# Patient Record
Sex: FEMALE | Race: WHITE | NOT HISPANIC OR LATINO | Employment: OTHER | URBAN - METROPOLITAN AREA
[De-identification: names, ages, dates, MRNs, and addresses within clinical notes are randomized per-mention and may not be internally consistent; named-entity substitution may affect disease eponyms.]

---

## 2022-11-02 ENCOUNTER — OFFICE VISIT (OUTPATIENT)
Dept: FAMILY MEDICINE CLINIC | Facility: CLINIC | Age: 72
End: 2022-11-02

## 2022-11-02 VITALS
HEIGHT: 65 IN | TEMPERATURE: 98 F | HEART RATE: 96 BPM | OXYGEN SATURATION: 95 % | BODY MASS INDEX: 18.83 KG/M2 | WEIGHT: 113 LBS

## 2022-11-02 DIAGNOSIS — F43.9 STRESS AT HOME: Primary | ICD-10-CM

## 2022-11-02 DIAGNOSIS — Z00.00 MEDICARE ANNUAL WELLNESS VISIT, SUBSEQUENT: ICD-10-CM
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
## 2022-11-02 DIAGNOSIS — M25.559 HIP PAIN: ICD-10-CM

## 2022-11-02 DIAGNOSIS — Z76.89 ENCOUNTER TO ESTABLISH CARE WITH NEW DOCTOR: ICD-10-CM

## 2022-11-02 DIAGNOSIS — L57.0 ACTINIC KERATOSIS: ICD-10-CM

## 2022-11-02 DIAGNOSIS — Z12.39 ENCOUNTER FOR BREAST CANCER SCREENING USING NON-MAMMOGRAM MODALITY: ICD-10-CM

## 2022-11-02 DIAGNOSIS — Z63.0 STRESS DUE TO MARITAL PROBLEMS: ICD-10-CM

## 2022-11-02 DIAGNOSIS — E89.40 ASYMPTOMATIC POSTSURGICAL MENOPAUSE: ICD-10-CM

## 2022-11-02 DIAGNOSIS — L82.1 SK (SEBORRHEIC KERATOSIS): ICD-10-CM

## 2022-11-02 SDOH — SOCIAL STABILITY - SOCIAL INSECURITY: PROBLEMS IN RELATIONSHIP WITH SPOUSE OR PARTNER: Z63.0

## 2022-11-02 NOTE — PROGRESS NOTES
Assessment and Plan:     Problem List Items Addressed This Visit        Other    Stress at home - Primary    Stress due to marital problems     Not interested in therapy or marriage counselor         Excess sun exposure    Relevant Orders    Ambulatory Referral to Dermatology      Other Visit Diagnoses     Encounter to establish care with new doctor        Medicare annual wellness visit, subsequent        Actinic keratosis        SK (seborrheic keratosis)        Asymptomatic postsurgical menopause        Relevant Orders    DXA bone density spine hip and pelvis    Encounter for breast cancer screening using non-mammogram modality        Relevant Orders    US breast screening bilateral complete (ABUS)    Hip pain            Patient wants to follow through with the procedures order today referral to Dermatology in wants to wait on any further lab work as she is worried about the cost       Depression Screening and Follow-up Plan: Patient was screened for depression during today's encounter  They screened negative with a PHQ-2 score of 0  Preventive health issues were discussed with patient, and age appropriate screening tests were ordered as noted in patient's After Visit Summary  Personalized health advice and appropriate referrals for health education or preventive services given if needed, as noted in patient's After Visit Summary  History of Present Illness:     Patient presents for a Medicare Wellness Visit    Declines Blood pressure check  Reports she is going to move back to [place]  Dealing with stress at home, does not like the weather here  Not interested in marriage counsoling  Living with ex   Wants to have skin checked, was a surfer and used tanning beds     Patient Care Team:  Sara Calhoun MD as PCP - General (Family Medicine)     Review of Systems:     Review of Systems   Constitutional: Negative for chills, fatigue and fever  HENT: Negative for rhinorrhea and sore throat  Eyes: Negative for visual disturbance  Respiratory: Negative for cough and shortness of breath  Cardiovascular: Negative for chest pain and palpitations  Gastrointestinal: Negative for abdominal pain, constipation, diarrhea, nausea and vomiting  Genitourinary: Negative for difficulty urinating, dysuria and frequency  Musculoskeletal: Positive for arthralgias  Negative for myalgias  Skin: Negative for color change and rash  Neurological: Negative for weakness and headaches  Psychiatric/Behavioral: Positive for behavioral problems  Problem List:     Patient Active Problem List   Diagnosis   • Stress at home   • Stress due to marital problems   • Excess sun exposure      Past Medical and Surgical History:     Past Medical History:   Diagnosis Date   • History of blood clots      History reviewed  No pertinent surgical history  Family History:     Family History   Problem Relation Age of Onset   • Cancer Mother    • Heart attack Father       Social History:     Social History     Socioeconomic History   • Marital status: /Civil Union     Spouse name: None   • Number of children: None   • Years of education: None   • Highest education level: None   Occupational History   • None   Tobacco Use   • Smoking status: Never Smoker   • Smokeless tobacco: Never Used   Vaping Use   • Vaping Use: Never used   Substance and Sexual Activity   • Alcohol use: Not Currently   • Drug use: Not Currently   • Sexual activity: Not Currently   Other Topics Concern   • None   Social History Narrative   • None     Social Determinants of Health     Financial Resource Strain: Low Risk    • Difficulty of Paying Living Expenses: Not hard at all   Food Insecurity: Not on file   Transportation Needs: No Transportation Needs   • Lack of Transportation (Medical): No   • Lack of Transportation (Non-Medical):  No   Physical Activity: Not on file   Stress: Not on file   Social Connections: Not on file   Intimate Partner Violence: Not on file   Housing Stability: Not on file      Medications and Allergies:     No current outpatient medications on file  No current facility-administered medications for this visit  Allergies   Allergen Reactions   • Aspirin Wheezing   • Iodine - Food Allergy Anaphylaxis   • Bee Venom Swelling   • Penicillins Hives      Immunizations: There is no immunization history on file for this patient  Health Maintenance:         Topic Date Due   • Hepatitis C Screening  Never done   • Breast Cancer Screening: Mammogram  Never done   • Colorectal Cancer Screening  Never done         Topic Date Due   • COVID-19 Vaccine (1) Never done   • Pneumococcal Vaccine: 65+ Years (1 - PCV) Never done   • Influenza Vaccine (1) Never done      Medicare Screening Tests and Risk Assessments:     Victorina Hutchins is here for her Subsequent Wellness visit  Last Medicare Wellness visit information reviewed, patient interviewed, no change since last AWV  Health Risk Assessment:   Patient rates overall health as very good  Patient feels that their physical health rating is same  Patient is satisfied with their life  Eyesight was rated as same  Hearing was rated as same  Patient feels that their emotional and mental health rating is same  Patients states they are sometimes angry  Patient states they are always unusually tired/fatigued  Pain experienced in the last 7 days has been some  Patient's pain rating has been 6/10  Patient states that she has experienced no weight loss or gain in last 6 months  Depression Screening:   PHQ-2 Score: 0      Fall Risk Screening: In the past year, patient has experienced: no history of falling in past year      Urinary Incontinence Screening:   Patient has not leaked urine accidently in the last six months  Home Safety:  Patient does not have trouble with stairs inside or outside of their home  Patient has working smoke alarms and has working carbon monoxide detector   Home safety hazards include: none  Nutrition:   Current diet is Regular  Medications:   Patient is currently taking over-the-counter supplements  OTC medications include: see medication list  Patient is able to manage medications  Activities of Daily Living (ADLs)/Instrumental Activities of Daily Living (IADLs):   Walk and transfer into and out of bed and chair?: Yes  Dress and groom yourself?: Yes    Bathe or shower yourself?: Yes    Feed yourself? Yes  Do your laundry/housekeeping?: Yes  Manage your money, pay your bills and track your expenses?: Yes  Make your own meals?: Yes    Do your own shopping?: Yes    Previous Hospitalizations:   Any hospitalizations or ED visits within the last 12 months?: Yes    How many hospitalizations have you had in the last year?: 1-2    Advance Care Planning:   Living will: No    Durable POA for healthcare: No      PREVENTIVE SCREENINGS        Cervical Cancer Screening:    General: Screening Not Indicated      Lung Cancer Screening:     General: Screening Not Indicated    Screening, Brief Intervention, and Referral to Treatment (SBIRT)    Screening  Typical number of drinks in a day: 0  Typical number of drinks in a week: 0  Interpretation: Low risk drinking behavior  Single Item Drug Screening:  How often have you used an illegal drug (including marijuana) or a prescription medication for non-medical reasons in the past year? never    Single Item Drug Screen Score: 0  Interpretation: Negative screen for possible drug use disorder    No exam data present     Physical Exam:     Pulse 96   Temp 98 °F (36 7 °C)   Ht 5' 5" (1 651 m)   Wt 51 3 kg (113 lb)   SpO2 95%   BMI 18 80 kg/m²     Physical Exam  Constitutional:       General: She is not in acute distress  Appearance: Normal appearance  She is not ill-appearing  HENT:      Head: Normocephalic and atraumatic        Right Ear: Tympanic membrane, ear canal and external ear normal       Left Ear: Tympanic membrane, ear canal and external ear normal       Nose: Nose normal       Mouth/Throat:      Mouth: Mucous membranes are moist       Pharynx: Oropharynx is clear  No oropharyngeal exudate or posterior oropharyngeal erythema  Eyes:      General: No scleral icterus  Right eye: No discharge  Left eye: No discharge  Extraocular Movements: Extraocular movements intact  Conjunctiva/sclera: Conjunctivae normal       Pupils: Pupils are equal, round, and reactive to light  Cardiovascular:      Rate and Rhythm: Normal rate and regular rhythm  Pulses: Normal pulses  Heart sounds: Normal heart sounds  No murmur heard  Pulmonary:      Effort: Pulmonary effort is normal  No respiratory distress  Breath sounds: Normal breath sounds  Abdominal:      General: Bowel sounds are normal       Palpations: Abdomen is soft  Tenderness: There is no abdominal tenderness  Musculoskeletal:         General: Normal range of motion  Cervical back: Normal range of motion and neck supple  Lymphadenopathy:      Cervical: No cervical adenopathy  Skin:     General: Skin is warm and dry  Capillary Refill: Capillary refill takes less than 2 seconds  Comments: AK on L hand, SK on left cheek     Neurological:      General: No focal deficit present  Mental Status: She is alert and oriented to person, place, and time  Mental status is at baseline  Cranial Nerves: No cranial nerve deficit     Psychiatric:         Mood and Affect: Mood normal           Shirley Zhong MD

## 2023-02-09 ENCOUNTER — TELEPHONE (OUTPATIENT)
Dept: FAMILY MEDICINE CLINIC | Facility: CLINIC | Age: 73
End: 2023-02-09

## 2023-02-09 NOTE — TELEPHONE ENCOUNTER
Received medical record request from [facility]  [place]  ([place]) via fax  MRR has been faxed to [facility] and scanned into pt's chart  Confirmation fax received

## 2023-06-27 ENCOUNTER — RA CDI HCC (OUTPATIENT)
Dept: OTHER | Facility: HOSPITAL | Age: 73
End: 2023-06-27

## 2023-06-27 NOTE — PROGRESS NOTES
[facility]  coding opportunities       Chart reviewed, no opportunity found: CHART REVIEWED, NO OPPORTUNITY FOUND        Patients Insurance        Commercial Insurance: [id number] [facility]

## 2023-06-29 ENCOUNTER — TELEPHONE (OUTPATIENT)
Dept: FAMILY MEDICINE CLINIC | Facility: CLINIC | Age: 73
End: 2023-06-29

## 2023-06-29 ENCOUNTER — OFFICE VISIT (OUTPATIENT)
Dept: FAMILY MEDICINE CLINIC | Facility: CLINIC | Age: 73
End: 2023-06-29
Payer: MEDICARE

## 2023-06-29 VITALS
TEMPERATURE: 98.6 F | SYSTOLIC BLOOD PRESSURE: 142 MMHG | BODY MASS INDEX: 19.49 KG/M2 | DIASTOLIC BLOOD PRESSURE: 80 MMHG | WEIGHT: 117 LBS | HEIGHT: 65 IN | OXYGEN SATURATION: 99 % | HEART RATE: 74 BPM

## 2023-06-29 DIAGNOSIS — R05.1 ACUTE COUGH: Primary | ICD-10-CM

## 2023-06-29 DIAGNOSIS — Z12.11 SCREEN FOR COLON CANCER: ICD-10-CM
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
## 2023-06-29 DIAGNOSIS — Z12.31 ENCOUNTER FOR SCREENING MAMMOGRAM FOR MALIGNANT NEOPLASM OF BREAST: ICD-10-CM

## 2023-06-29 PROBLEM — F43.9 STRESS AT HOME: Status: RESOLVED | Noted: 2022-11-02 | Resolved: 2023-06-29

## 2023-06-29 PROCEDURE — 99214 OFFICE O/P EST MOD 30 MIN: CPT | Performed by: STUDENT IN AN ORGANIZED HEALTH CARE EDUCATION/TRAINING PROGRAM

## 2023-06-29 RX ORDER — ALBUTEROL SULFATE 90 UG/1
2 AEROSOL, METERED RESPIRATORY (INHALATION) EVERY 6 HOURS PRN
Qty: 18 G | Refills: 5 | Status: SHIPPED | OUTPATIENT
Start: 2023-06-29

## 2023-06-29 NOTE — PROGRESS NOTES
Assessment/Plan:         Problem List Items Addressed This Visit        Other    Excess sun exposure    Relevant Orders    Ambulatory Referral to Dermatology   Other Visit Diagnoses     Acute cough    -  Primary    Relevant Medications    albuterol (Ventolin HFA) 90 mcg/act inhaler    Encounter for screening mammogram for malignant neoplasm of breast        Relevant Orders    Mammo screening bilateral w cad    Screen for colon cancer        Relevant Orders    Ambulatory Referral to Gastroenterology        Albuterol, anti histamines  [facility]  If no improvement will get a PFT    Subjective:      Patient ID: Adelia Man is a 68 y o  female  HPI    Returned from [place] 3-4 weeks ago  When she returned she started to have difficulties breathing  Worse whent he local smog worsens  The following portions of the patient's history were reviewed and updated as appropriate:   Past Medical History:  She has a past medical history of History of blood clots  ,  _______________________________________________________________________  Medical Problems:  does not have any pertinent problems on file ,  _______________________________________________________________________  Past Surgical History:   has no past surgical history on file ,  _______________________________________________________________________  Family History:  family history includes Cancer in her mother; Heart attack in her father ,  _______________________________________________________________________  Social History:   reports that she has never smoked  She has never used smokeless tobacco  She reports that she does not currently use alcohol  She reports that she does not currently use drugs  ,  _______________________________________________________________________  Allergies:  is allergic to aspirin, iodine - food allergy, bee venom, and "penicillins     _______________________________________________________________________  Current Outpatient Medications   Medication Sig Dispense Refill   • albuterol (Ventolin HFA) 90 mcg/act inhaler Inhale 2 puffs every 6 (six) hours as needed for wheezing 18 g 5     No current facility-administered medications for this visit      _______________________________________________________________________  Review of Systems   Constitutional: Negative for chills, fatigue and fever  HENT: Negative for rhinorrhea and sore throat  Eyes: Negative for visual disturbance  Respiratory: Positive for cough and chest tightness  Negative for shortness of breath and wheezing  Cardiovascular: Negative for chest pain and palpitations  Gastrointestinal: Negative for abdominal pain, constipation, diarrhea, nausea and vomiting  Genitourinary: Negative for difficulty urinating, dysuria and frequency  Musculoskeletal: Negative for arthralgias and myalgias  Skin: Negative for color change and rash  Neurological: Negative for weakness and headaches  Objective:  Vitals:    06/29/23 1048   BP: 142/80   Pulse: 74   Temp: 98 6 °F (37 °C)   SpO2: 99%   Weight: 53 1 kg (117 lb)   Height: 5' 5\" (1 651 m)     Body mass index is 19 47 kg/m²  Physical Exam  Constitutional:       General: She is not in acute distress  Appearance: She is not ill-appearing  HENT:      Head: Normocephalic and atraumatic  Right Ear: External ear normal       Left Ear: External ear normal       Nose: Nose normal  No congestion or rhinorrhea  Mouth/Throat:      Mouth: Mucous membranes are moist       Pharynx: Oropharynx is clear  No oropharyngeal exudate or posterior oropharyngeal erythema  Eyes:      Extraocular Movements: Extraocular movements intact  Conjunctiva/sclera: Conjunctivae normal       Pupils: Pupils are equal, round, and reactive to light     Cardiovascular:      Rate and Rhythm: Normal rate and regular " rhythm  Pulses: Normal pulses  Heart sounds: No murmur heard  Pulmonary:      Effort: Pulmonary effort is normal  No respiratory distress  Breath sounds: Examination of the right-upper field reveals wheezing  Wheezing (faint end expiratory) present  Chest:      Chest wall: No tenderness  Abdominal:      General: Bowel sounds are normal       Palpations: Abdomen is soft  Tenderness: There is no abdominal tenderness  Musculoskeletal:         General: Normal range of motion  Cervical back: Normal range of motion  Skin:     General: Skin is warm and dry  Capillary Refill: Capillary refill takes less than 2 seconds  Findings: No rash  Neurological:      General: No focal deficit present  Mental Status: She is alert  Mental status is at baseline

## 2023-06-29 NOTE — TELEPHONE ENCOUNTER
At check out patient requested an [address] with the mammogram due to having breast implants  Please advise

## 2023-06-29 NOTE — TELEPHONE ENCOUNTER
Also pt asking if Alexus sent in albuterol for her - notified, info given as well  Pt will contact pharmacy to schedule /delivery

## 2023-06-29 NOTE — TELEPHONE ENCOUNTER
Pt notified - aware to schedule this appointment, please contact Central Scheduling at [phone]

## 2023-09-18 ENCOUNTER — OFFICE VISIT (OUTPATIENT)
Dept: FAMILY MEDICINE CLINIC | Facility: CLINIC | Age: 73
End: 2023-09-18
Payer: MEDICARE

## 2023-09-18 VITALS
WEIGHT: 120 LBS | BODY MASS INDEX: 19.99 KG/M2 | DIASTOLIC BLOOD PRESSURE: 76 MMHG | HEART RATE: 73 BPM | HEIGHT: 65 IN | TEMPERATURE: 97.2 F | OXYGEN SATURATION: 97 % | SYSTOLIC BLOOD PRESSURE: 138 MMHG

## 2023-09-18 DIAGNOSIS — F41.9 ANXIETY: ICD-10-CM

## 2023-09-18 DIAGNOSIS — D69.6 THROMBOCYTOPENIA (HCC): ICD-10-CM

## 2023-09-18 DIAGNOSIS — E87.6 HYPOKALEMIA: Primary | ICD-10-CM

## 2023-09-18 DIAGNOSIS — E46 PROTEIN-CALORIE MALNUTRITION, UNSPECIFIED SEVERITY (HCC): ICD-10-CM

## 2023-09-18 DIAGNOSIS — Z09 HOSPITAL DISCHARGE FOLLOW-UP: ICD-10-CM

## 2023-09-18 DIAGNOSIS — F10.931 ALCOHOL WITHDRAWAL SEIZURE WITH DELIRIUM (HCC): ICD-10-CM

## 2023-09-18 DIAGNOSIS — R56.9 ALCOHOL WITHDRAWAL SEIZURE WITH DELIRIUM (HCC): ICD-10-CM

## 2023-09-18 PROCEDURE — 99214 OFFICE O/P EST MOD 30 MIN: CPT | Performed by: STUDENT IN AN ORGANIZED HEALTH CARE EDUCATION/TRAINING PROGRAM

## 2023-09-18 RX ORDER — SERTRALINE HYDROCHLORIDE 25 MG/1
25 TABLET, FILM COATED ORAL DAILY
Qty: 90 TABLET | Refills: 0 | Status: SHIPPED | OUTPATIENT
Start: 2023-09-18 | End: 2024-03-16

## 2023-09-18 NOTE — PROGRESS NOTES
Assessment/Plan:         Problem List Items Addressed This Visit        Hematopoietic and Hemostatic    Thrombocytopenia ([address])    Relevant Orders    CBC and Platelet       Other    Protein-calorie malnutrition, unspecified severity ([address])   Other Visit Diagnoses     Hospital discharge follow-up    -  Primary    Relevant Orders    Ambulatory Referral to Social Work Care Management Program    Ambulatory referral to [facility]    Ambulatory Referral to [facility]    Alcohol withdrawal seizure with delirium ([address])        Relevant Medications    sertraline (ZOLOFT) 25 mg tablet    Other Relevant Orders    Ambulatory Referral to Social Work Care Management Program    Ambulatory referral to Psych Services    Ambulatory Referral to Behavioral Health    Anxiety        Relevant Medications    sertraline (ZOLOFT) 25 mg tablet        Start medication, declines any cravings for alcohol spo hold on naltrexone at this time. , reviewed hospital notes and labs, bruising can be from platelets will recheck. continue with thiamine and folic acid supplements     Subjective:      Patient ID: Lizbeth Stauffer is a 68 y.o. female. HPI    Hospitalized for ETOH withdrawal and seizure. States she binges etoh to control anxiety and ptsd flash backs (nightmares). Wants to take something to help with anxieyt/ptsd. (fouind family members dead, trauma they had from [address])    Had a rash for a week after taking thiamine and folic acid    bruising easier    The following portions of the patient's history were reviewed and updated as appropriate:   Past Medical History:  She has a past medical history of History of blood clots. ,  _______________________________________________________________________  Medical Problems:  does not have any pertinent problems on file.,  _______________________________________________________________________  Past Surgical History:   has no past surgical history on file.,  _______________________________________________________________________  Family History:  family history includes Cancer in her mother; Heart attack in her father.,  _______________________________________________________________________  Social History:   reports that she has never smoked. She has never used smokeless tobacco. She reports that she does not currently use alcohol. She reports that she does not currently use drugs. ,  _______________________________________________________________________  Allergies:  is allergic to aspirin, iodine - food allergy, bee venom, and penicillins. .  _______________________________________________________________________  Current Outpatient Medications   Medication Sig Dispense Refill   • sertraline (ZOLOFT) 25 mg tablet Take 1 tablet (25 mg total) by mouth daily After 3 weeks can take 2 tablets daily (50mg total) 90 tablet 0     No current facility-administered medications for this visit.     _______________________________________________________________________  Review of Systems   Constitutional: Negative for chills, fatigue and fever. HENT: Negative for rhinorrhea and sore throat. Eyes: Negative for visual disturbance. Respiratory: Negative for cough and shortness of breath. Cardiovascular: Negative for chest pain and palpitations. Gastrointestinal: Negative for abdominal pain, constipation, diarrhea, nausea and vomiting. Genitourinary: Negative for difficulty urinating, dysuria and frequency. Musculoskeletal: Negative for arthralgias and myalgias. Skin: Positive for rash. Negative for color change. Neurological: Negative for weakness and headaches. Psychiatric/Behavioral: Negative for sleep disturbance.          Objective:  Vitals:    09/18/23 0816   BP: 138/76   BP Location: Left arm   Patient Position: Sitting   Cuff Size: Standard   Pulse: 73   Temp: (!) 97.2 °F (36.2 °C)   SpO2: 97%   Weight: 54.4 kg (120 lb)   Height: 5' 5" (1.651 m) Body mass index is 19.97 kg/m². Physical Exam  Constitutional:       General: She is not in acute distress. Appearance: She is not ill-appearing. HENT:      Head: Normocephalic and atraumatic. Right Ear: External ear normal.      Left Ear: External ear normal.      Nose: Nose normal. No congestion or rhinorrhea. Mouth/Throat:      Mouth: Mucous membranes are moist.      Pharynx: Oropharynx is clear. No oropharyngeal exudate or posterior oropharyngeal erythema. Eyes:      Extraocular Movements: Extraocular movements intact. Conjunctiva/sclera: Conjunctivae normal.      Pupils: Pupils are equal, round, and reactive to light. Cardiovascular:      Rate and Rhythm: Normal rate and regular rhythm. Pulses: Normal pulses. Heart sounds: No murmur heard. Pulmonary:      Effort: Pulmonary effort is normal. No respiratory distress. Breath sounds: Normal breath sounds. No wheezing. Chest:      Chest wall: No tenderness. Abdominal:      General: Bowel sounds are normal.      Palpations: Abdomen is soft. Tenderness: There is no abdominal tenderness. Musculoskeletal:         General: Normal range of motion. Cervical back: Normal range of motion. Skin:     General: Skin is warm and dry. Capillary Refill: Capillary refill takes less than 2 seconds. Findings: Ecchymosis present. No rash. Neurological:      General: No focal deficit present. Mental Status: She is alert. Mental status is at baseline.

## 2023-09-20 ENCOUNTER — PATIENT OUTREACH (OUTPATIENT)
Dept: FAMILY MEDICINE CLINIC | Facility: CLINIC | Age: 73
End: 2023-09-20

## 2023-09-20 NOTE — PROGRESS NOTES
LETTY IVERSON received referral for patient for hospital follow up. LETTY IVERSON reviewed patient's chart. Referrals also placed for Psych and [address] [place] [address] department. Patient with recent alcohol withdrawal seizure with delirium after a binge drinking episode. Per chart review, patient has struggled with a long life of trauma and anxiety as well. LETTY IVERSON reached out to patient to inquire if able to offer any assistance. Patient reports that she is looking forward to starting counseling, however is concerned with her insurance. She has Medicare Part A, B, C, and D, as well as [employer] as a supplement. Patient recently learned that she did not have eye or dental coverage and is now concerned regarding behavioral health. LETTY IVERSON informed her that generally this is covered by her Medicare Part B, however to confirmed once Behavioral Health calls her for an appointment. LETTY IVERSON did make patient aware that there is frequently a wait list.    Patient states that she has a Master's Degree in Psychology of which can make her a difficult client at times, but that she does want help and know that she needs some assistance due to the long last effects of her trauma. LETTY IVERSON discussed EMDR therapy as a possibility as well and sent her an e-mail from [facility] with more information along with the information for a Certified EMDR Therapist in [address] [place] [address] whom accepted Medicare insurance. Plan for f/u with Behavioral Health to inquire if they can run patient's insurance and will f/u with patient within 1 week regarding the above for continuity of care.

## 2023-09-26 ENCOUNTER — APPOINTMENT (OUTPATIENT)
Age: 73
End: 2023-09-26
Payer: MEDICARE

## 2023-09-26 DIAGNOSIS — D69.6 THROMBOCYTOPENIA (HCC): ICD-10-CM

## 2023-09-26 DIAGNOSIS — E87.6 HYPOKALEMIA: ICD-10-CM

## 2023-09-26 LAB
ALBUMIN SERPL BCP-MCNC: 4 G/DL (ref 3.5–5)
ALP SERPL-CCNC: 105 U/L (ref 34–104)
ALT SERPL W P-5'-P-CCNC: 14 U/L (ref 7–52)
ANION GAP SERPL CALCULATED.3IONS-SCNC: 8 MMOL/L
AST SERPL W P-5'-P-CCNC: 21 U/L (ref 13–39)
BILIRUB SERPL-MCNC: 0.39 MG/DL (ref 0.2–1)
BUN SERPL-MCNC: 11 MG/DL (ref 5–25)
CALCIUM SERPL-MCNC: 9.9 MG/DL (ref 8.4–10.2)
CHLORIDE SERPL-SCNC: 107 MMOL/L (ref 96–108)
CO2 SERPL-SCNC: 27 MMOL/L (ref 21–32)
CREAT SERPL-MCNC: 0.77 MG/DL (ref 0.6–1.3)
ERYTHROCYTE [DISTWIDTH] IN BLOOD BY AUTOMATED COUNT: 13.6 % (ref 11.6–15.1)
GFR SERPL CREATININE-BSD FRML MDRD: 76 ML/MIN/1.73SQ M
GLUCOSE P FAST SERPL-MCNC: 86 MG/DL (ref 65–99)
HCT VFR BLD AUTO: 44.9 % (ref 34.8–46.1)
HGB BLD-MCNC: 14.5 G/DL (ref 11.5–15.4)
MAGNESIUM SERPL-MCNC: 2.4 MG/DL (ref 1.9–2.7)
MCH RBC QN AUTO: 32.7 PG (ref 26.8–34.3)
MCHC RBC AUTO-ENTMCNC: 32.3 G/DL (ref 31.4–37.4)
MCV RBC AUTO: 101 FL (ref 82–98)
PLATELET # BLD AUTO: 222 THOUSANDS/UL (ref 149–390)
PMV BLD AUTO: 10.4 FL (ref 8.9–12.7)
POTASSIUM SERPL-SCNC: 4.5 MMOL/L (ref 3.5–5.3)
PROT SERPL-MCNC: 6.9 G/DL (ref 6.4–8.4)
RBC # BLD AUTO: 4.43 MILLION/UL (ref 3.81–5.12)
SODIUM SERPL-SCNC: 142 MMOL/L (ref 135–147)
WBC # BLD AUTO: 5 THOUSAND/UL (ref 4.31–10.16)

## 2023-09-26 PROCEDURE — 36415 COLL VENOUS BLD VENIPUNCTURE: CPT

## 2023-09-26 PROCEDURE — 85027 COMPLETE CBC AUTOMATED: CPT

## 2023-09-26 PROCEDURE — 80053 COMPREHEN METABOLIC PANEL: CPT

## 2023-09-26 PROCEDURE — 83735 ASSAY OF MAGNESIUM: CPT

## 2023-10-03 ENCOUNTER — TELEPHONE (OUTPATIENT)
Age: 73
End: 2023-10-03

## 2023-10-03 NOTE — TELEPHONE ENCOUNTER
Rec'd call from patient requesting NEW for FULL BODY/DISCOLORATION SPOTS ON FACE. Explained wait/cancellation list processes and MyChart notification. Understanding was verbalized. Created wait/cancellation list for patient.

## 2023-10-04 ENCOUNTER — TELEPHONE (OUTPATIENT)
Dept: PSYCHIATRY | Facility: CLINIC | Age: 73
End: 2023-10-04

## 2023-10-04 ENCOUNTER — PATIENT OUTREACH (OUTPATIENT)
Dept: FAMILY MEDICINE CLINIC | Facility: CLINIC | Age: 73
End: 2023-10-04

## 2023-10-04 NOTE — TELEPHONE ENCOUNTER
Asocial worker called on behalf of patient in regards to a referral that was placed for patient, writer called back and lvm for SW to call back at her earliest convenience

## 2023-10-04 NOTE — PROGRESS NOTES
LETTY IVERSON reached out to [facility] and left a message regarding previous referral.  LETTY IVERSON reached out to patient as well regarding updates. Patient confirmed she has an appointment with a Counselor on 10/11 and will let LETTY IVERSON know if there are any insurance issues from this. Patient appreciative of call. LETTY IVERSON encouraged patient to call with any future needs/issues as they arise.

## 2023-10-09 DIAGNOSIS — F41.9 ANXIETY: ICD-10-CM

## 2023-10-09 RX ORDER — SERTRALINE HYDROCHLORIDE 25 MG/1
25 TABLET, FILM COATED ORAL DAILY
Qty: 90 TABLET | Refills: 0 | Status: SHIPPED | OUTPATIENT
Start: 2023-10-09 | End: 2024-04-06

## 2023-10-11 ENCOUNTER — SOCIAL WORK (OUTPATIENT)
Dept: BEHAVIORAL/MENTAL HEALTH CLINIC | Facility: CLINIC | Age: 73
End: 2023-10-11
Payer: MEDICARE

## 2023-10-11 DIAGNOSIS — F41.9 ANXIETY: Primary | ICD-10-CM

## 2023-10-11 PROCEDURE — 90834 PSYTX W PT 45 MINUTES: CPT | Performed by: SOCIAL WORKER

## 2023-10-11 NOTE — PSYCH
Behavioral Health Psychotherapy Note    Psychotherapy Provided: Individual Psychotherapy     1. Anxiety            DATA: Therapist met w/pt who is a 68year old female for initial session due to increased symptoms of anxiety. Pt stated that she has been a binge drinker and recently it has gotten out of control and "scary."  Pt stated that she wants to learn how to better manage her feelings. She stated she has experienced  a lot in her life including a lot of grief/loss. Pt stated she is a "difficult" pt and has been in therapy before but tends to intellectualize things. She stated that that is part of her problem is that she intellectualizes and thinks she knows more than the therapist.  Pt stated she has a big family and wants to be around to spend time w/them. She stated that she has a lot of underlying issues w/her mother. She stated she focuses on exercise and eating healthy to maintain her weight. During this session, this clinician used the following therapeutic modalities: Client-centered Therapy, Cognitive Behavioral Therapy, Motivational Interviewing, and Solution-Focused Therapy    Substance Abuse was addressed during this session. If the client is diagnosed with a co-occurring substance use disorder, please indicate any changes in the frequency or amount of use: none in the past 2 weeks. Stage of change for addressing substance use diagnoses: Preparation    ASSESSMENT:  Marquez Porter presents with a Anxious mood. her affect is Normal range and intensity, which is congruent, with her mood and the content of the session. Marquez Porter presents with a minimal risk of suicide, minimal risk of self-harm, and none risk of harm to others. For any risk assessment that surpasses a "low" rating, a safety plan must be developed.     A safety plan was indicated: no  If yes, describe in detail n/a    PLAN: Pt will meet w/pt in 2-3 weeks    [address] [place] [address] and Discharge Planning: Blossom Robbins is aware of and agrees to continue to work on their treatment plan. They have identified and are working toward their discharge goals.  yes    Visit start and stop times:    10/11/23  Start Time: 1330  Stop Time: 1420  Total Visit Time: 50 minutes

## 2023-10-26 ENCOUNTER — OFFICE VISIT (OUTPATIENT)
Dept: FAMILY MEDICINE CLINIC | Facility: CLINIC | Age: 73
End: 2023-10-26
Payer: MEDICARE

## 2023-10-26 VITALS
TEMPERATURE: 98 F | DIASTOLIC BLOOD PRESSURE: 78 MMHG | HEIGHT: 65 IN | HEART RATE: 66 BPM | OXYGEN SATURATION: 98 % | SYSTOLIC BLOOD PRESSURE: 134 MMHG | BODY MASS INDEX: 19.86 KG/M2 | WEIGHT: 119.2 LBS

## 2023-10-26 DIAGNOSIS — F41.9 ANXIETY: ICD-10-CM

## 2023-10-26 DIAGNOSIS — T78.40XS ALLERGY, SEQUELA: ICD-10-CM

## 2023-10-26 DIAGNOSIS — T78.40XS ALLERGY, SEQUELA: Primary | ICD-10-CM

## 2023-10-26 DIAGNOSIS — R09.81 SINUS CONGESTION: ICD-10-CM

## 2023-10-26 PROCEDURE — 99214 OFFICE O/P EST MOD 30 MIN: CPT | Performed by: STUDENT IN AN ORGANIZED HEALTH CARE EDUCATION/TRAINING PROGRAM

## 2023-10-26 RX ORDER — FLUTICASONE PROPIONATE 50 MCG
SPRAY, SUSPENSION (ML) NASAL
Qty: 1 ML | Refills: 0 | Status: SHIPPED | OUTPATIENT
Start: 2023-10-26

## 2023-10-26 RX ORDER — FLUTICASONE PROPIONATE 50 MCG
1 SPRAY, SUSPENSION (ML) NASAL DAILY
Qty: 11.1 ML | Refills: 0 | Status: SHIPPED | OUTPATIENT
Start: 2023-10-26 | End: 2023-10-26

## 2023-10-26 RX ORDER — MOMETASONE FUROATE 50 UG/1
SPRAY, METERED NASAL
Qty: 17 ACT | Refills: 1 | Status: SHIPPED | OUTPATIENT
Start: 2023-10-26 | End: 2023-10-26

## 2023-10-26 RX ORDER — LORATADINE 10 MG/1
10 TABLET ORAL DAILY
Qty: 45 TABLET | Refills: 0 | Status: SHIPPED | OUTPATIENT
Start: 2023-10-26 | End: 2023-12-10

## 2023-10-26 NOTE — PROGRESS NOTES
Assessment/Plan:         Problem List Items Addressed This Visit        Other    Anxiety     Increase to 50mg        Other Visit Diagnoses     Allergy, sequela    -  Primary    Relevant Medications    loratadine (CLARITIN) 10 mg tablet    mometasone (NASONEX) 50 mcg/act nasal spray    Sinus congestion        Relevant Medications    loratadine (CLARITIN) 10 mg tablet    mometasone (NASONEX) 50 mcg/act nasal spray            Subjective:      Patient ID: Berna Carney is a 68 y.o. female. Shortness of Breath  The current episode started more than 1 month ago. The problem occurs daily. The problem has been waxing and waning since onset. Associated symptoms include orthopnea and rhinorrhea. Pertinent negatives include no chest pain, leg swelling, sore throat or wheezing. The symptoms are aggravated by pollens, weather changes and lying flat. Dealing with allergies. Trouble breathing at night, lots of mucus. Has had burning in nose    Dry cough, chest tightness. Using albuterol inhaler    The following portions of the patient's history were reviewed and updated as appropriate:   Past Medical History:  She has a past medical history of History of blood clots. ,  _______________________________________________________________________  Medical Problems:  does not have any pertinent problems on file.,  _______________________________________________________________________  Past Surgical History:   has no past surgical history on file.,  _______________________________________________________________________  Family History:  family history includes Cancer in her mother; Heart attack in her father.,  _______________________________________________________________________  Social History:   reports that she has never smoked. She has never used smokeless tobacco. She reports that she does not currently use alcohol.  She reports that she does not currently use drugs.,  _______________________________________________________________________  Allergies:  is allergic to aspirin, iodine - food allergy, bee venom, and penicillins. .  _______________________________________________________________________  Current Outpatient Medications   Medication Sig Dispense Refill   • loratadine (CLARITIN) 10 mg tablet Take 1 tablet (10 mg total) by mouth daily 45 tablet 0   • mometasone (NASONEX) 50 mcg/act nasal spray 2 sprays daily into each nostril 17 Act 1   • sertraline (ZOLOFT) 25 mg tablet TAKE 1 TABLET (25 MG TOTAL) BY MOUTH DAILY AFTER 3 WEEKS CAN TAKE 2 TABLETS DAILY (50MG TOTAL) 90 tablet 0     No current facility-administered medications for this visit.     _______________________________________________________________________  Review of Systems   Constitutional:  Negative for fever. HENT:  Positive for rhinorrhea. Negative for ear pain and sore throat. Respiratory:  Positive for shortness of breath. Negative for wheezing. Cardiovascular:  Positive for orthopnea. Negative for chest pain and leg swelling. Gastrointestinal:  Negative for abdominal pain and vomiting. Musculoskeletal:  Negative for neck pain. Skin:  Positive for rash. Neurological:  Negative for headaches. Objective:  Vitals:    10/26/23 1012   BP: 134/78   BP Location: Right arm   Patient Position: Sitting   Pulse: 66   Temp: 98 °F (36.7 °C)   SpO2: 98%   Weight: 54.1 kg (119 lb 3.2 oz)   Height: 5' 5" (1.651 m)     Body mass index is 19.84 kg/m². Physical Exam  HENT:      Head: Normocephalic. Right Ear: Ear canal and external ear normal. Tympanic membrane is bulging. Tympanic membrane is not erythematous. Left Ear: Ear canal and external ear normal. Tympanic membrane is bulging. Tympanic membrane is not erythematous. Nose: Mucosal edema, congestion and rhinorrhea present. Rhinorrhea is clear. Neurological:      Mental Status: She is alert.          Answers submitted by the patient for this visit:  Shortness of Breath Questionnaire (Submitted on 10/24/2023)  Chief Complaint: Shortness of breath  Chronicity: recurrent  Episode duration: 4 Hours  claudication: No  coryza: Yes  hemoptysis: No  leg pain: No  PND: No  sputum production: No  swollen glands: No  syncope:  No

## 2023-11-07 ENCOUNTER — SOCIAL WORK (OUTPATIENT)
Dept: BEHAVIORAL/MENTAL HEALTH CLINIC | Facility: CLINIC | Age: 73
End: 2023-11-07
Payer: MEDICARE

## 2023-11-07 DIAGNOSIS — F41.9 ANXIETY: Primary | ICD-10-CM

## 2023-11-07 PROCEDURE — 90834 PSYTX W PT 45 MINUTES: CPT | Performed by: SOCIAL WORKER

## 2023-11-10 NOTE — PSYCH
Behavioral Health Psychotherapy Progress Note    Psychotherapy Provided: Individual Psychotherapy     1. Anxiety            DATA: Therapist met w/pt for individual session. Pt stated that she is having a hard time being here in [place]. She stated she likes it here but it continues to be a big transition from where she lived in [place] and she feels isolated and lonely. Therapist and pt processed these feelings and helped to figure out ways to work on challenging her negative thoughts. During this session, this clinician used the following therapeutic modalities: Client-centered Therapy and Cognitive Behavioral Therapy    Substance Abuse was not addressed during this session. If the client is diagnosed with a co-occurring substance use disorder, please indicate any changes in the frequency or amount of use: unknown. Stage of change for addressing substance use diagnoses: No substance use/Not applicable    ASSESSMENT:  Beba Guadalupe presents with a Anxious mood. her affect is Normal range and intensity, which is congruent, with her mood and the content of the session. The client has made progress on their goals. Beba Guadalupe presents with a none risk of suicide, none risk of self-harm, and none risk of harm to others. For any risk assessment that surpasses a "low" rating, a safety plan must be developed. A safety plan was indicated: no  If yes, describe in detail n/a    PLAN: Between sessions, Beba Guadalupe will continue to challenge her anxious thoughts, start doing things outside the home. At the next session, the therapist will use Client-centered Therapy and Cognitive Behavioral Therapy to address symptoms of anxiety and depression. Behavioral Health Treatment Plan and Discharge Planning: Beba Guadalupe is aware of and agrees to continue to work on their treatment plan. They have identified and are working toward their discharge goals.  yes    Visit start and stop times:    11/7/23  Start Time: 1445  Stop Time: 1530  Total Visit Time: 45 minutes

## 2023-11-13 ENCOUNTER — RA CDI HCC (OUTPATIENT)
Dept: OTHER | Facility: HOSPITAL | Age: 73
End: 2023-11-13

## 2023-11-17 DIAGNOSIS — T78.40XS ALLERGY, SEQUELA: ICD-10-CM

## 2023-11-17 DIAGNOSIS — R09.81 SINUS CONGESTION: ICD-10-CM

## 2023-11-17 RX ORDER — FLUTICASONE PROPIONATE 50 MCG
SPRAY, SUSPENSION (ML) NASAL
Qty: 48 ML | Refills: 1 | Status: SHIPPED | OUTPATIENT
Start: 2023-11-17 | End: 2023-11-20

## 2023-11-20 ENCOUNTER — OFFICE VISIT (OUTPATIENT)
Dept: FAMILY MEDICINE CLINIC | Facility: CLINIC | Age: 73
End: 2023-11-20
Payer: MEDICARE

## 2023-11-20 VITALS
WEIGHT: 121 LBS | SYSTOLIC BLOOD PRESSURE: 144 MMHG | HEIGHT: 65 IN | BODY MASS INDEX: 20.16 KG/M2 | DIASTOLIC BLOOD PRESSURE: 84 MMHG | HEART RATE: 77 BPM | TEMPERATURE: 97.5 F | OXYGEN SATURATION: 97 %

## 2023-11-20 DIAGNOSIS — T78.40XS ALLERGY, SEQUELA: ICD-10-CM

## 2023-11-20 DIAGNOSIS — Z00.00 ENCOUNTER FOR ANNUAL WELLNESS VISIT (AWV) IN MEDICARE PATIENT: ICD-10-CM

## 2023-11-20 DIAGNOSIS — S03.00XA DISLOCATION OF TEMPOROMANDIBULAR JOINT, INITIAL ENCOUNTER: ICD-10-CM

## 2023-11-20 DIAGNOSIS — R68.84 JAW PAIN: ICD-10-CM

## 2023-11-20 DIAGNOSIS — Z00.00 MEDICARE ANNUAL WELLNESS VISIT, SUBSEQUENT: ICD-10-CM

## 2023-11-20 DIAGNOSIS — F41.9 ANXIETY: Primary | ICD-10-CM

## 2023-11-20 PROCEDURE — G0439 PPPS, SUBSEQ VISIT: HCPCS | Performed by: STUDENT IN AN ORGANIZED HEALTH CARE EDUCATION/TRAINING PROGRAM

## 2023-11-20 PROCEDURE — 99214 OFFICE O/P EST MOD 30 MIN: CPT | Performed by: STUDENT IN AN ORGANIZED HEALTH CARE EDUCATION/TRAINING PROGRAM

## 2023-11-20 NOTE — PROGRESS NOTES
Assessment and Plan:     Problem List Items Addressed This Visit        Other    Anxiety - Primary     Stable on 50mg        Other Visit Diagnoses     Encounter for annual wellness visit (AWV) in Medicare patient        Jaw pain        Relevant Orders    Ambulatory Referral to Otolaryngology    Dislocation of temporomandibular joint, initial encounter        Relevant Orders    Ambulatory Referral to Otolaryngology    Allergy, sequela        Medicare annual wellness visit, subsequent              Depression Screening and Follow-up Plan: Patient was screened for depression during today's encounter. They screened negative with a PHQ-2 score of 0. Work with private dentist and can set up with ent for TMJ assistance    Continue with daily anti histamine, increase flonase to BId  Preventive health issues were discussed with patient, and age appropriate screening tests were ordered as noted in patient's After Visit Summary. Personalized health advice and appropriate referrals for health education or preventive services given if needed, as noted in patient's After Visit Summary. History of Present Illness:      Patient presents for a Medicare Wellness Visit    HPI     Very concerned about 2lb weight gain, works out    Anxiety is is much better , seeing therapist     Jaw pain, hx of TMJ and night guards to help with this. Non longer has dental insurance. Worse since having the seizure, 2-3/10 pain constant. Lots of jaw clicking. Sees dentist (At [facility], Dr Becky Calvo)    Loli Aberdeen stressed because of traffic today, new senior dog adopted     Patient Care Team:  Maddy Be MD as PCP - General (Family Medicine)     Review of Systems:     Review of Systems   Constitutional:  Negative for chills, fatigue and fever. HENT:  Negative for rhinorrhea and sore throat. Eyes:  Negative for visual disturbance. Respiratory:  Negative for cough and shortness of breath.     Cardiovascular:  Negative for chest pain and palpitations. Gastrointestinal:  Negative for abdominal pain, constipation, diarrhea, nausea and vomiting. Genitourinary:  Negative for difficulty urinating, dysuria and frequency. Musculoskeletal:  Positive for myalgias (jaw pain). Negative for arthralgias. Skin:  Negative for color change and rash. Neurological:  Negative for weakness and headaches. Problem List:     Patient Active Problem List   Diagnosis   • Stress due to marital problems   • Excess sun exposure   • Protein-calorie malnutrition, unspecified severity ([address])   • Thrombocytopenia (HCC)   • Anxiety      Past Medical and Surgical History:     Past Medical History:   Diagnosis Date   • History of blood clots      History reviewed. No pertinent surgical history. Family History:     Family History   Problem Relation Age of Onset   • Cancer Mother    • Heart attack Father       Social History:     Social History     Socioeconomic History   • Marital status: /Civil Union     Spouse name: None   • Number of children: None   • Years of education: None   • Highest education level: None   Occupational History   • None   Tobacco Use   • Smoking status: Never   • Smokeless tobacco: Never   Vaping Use   • Vaping Use: Never used   Substance and Sexual Activity   • Alcohol use: Not Currently   • Drug use: Not Currently   • Sexual activity: Not Currently   Other Topics Concern   • None   Social History Narrative   • None     Social Determinants of Health     Financial Resource Strain: Low Risk  (11/20/2023)    Overall Financial Resource Strain (CARDIA)    • Difficulty of Paying Living Expenses: Not hard at all   Food Insecurity: Not on file   Transportation Needs: No Transportation Needs (11/20/2023)    PRAPARE - Transportation    • Lack of Transportation (Medical): No    • Lack of Transportation (Non-Medical):  No   Physical Activity: Not on file   Stress: Not on file   Social Connections: Not on file   Intimate Partner Violence: Not on file   Housing Stability: Not on file      Medications and Allergies:     Current Outpatient Medications   Medication Sig Dispense Refill   • loratadine (CLARITIN) 10 mg tablet Take 1 tablet (10 mg total) by mouth daily 45 tablet 0   • sertraline (ZOLOFT) 25 mg tablet TAKE 1 TABLET (25 MG TOTAL) BY MOUTH DAILY AFTER 3 WEEKS CAN TAKE 2 TABLETS DAILY (50MG TOTAL) 90 tablet 0     No current facility-administered medications for this visit. Allergies   Allergen Reactions   • Aspirin Wheezing   • Iodine - Food Allergy Anaphylaxis   • Bee Venom Swelling   • Penicillins Hives      Immunizations: There is no immunization history on file for this patient. Health Maintenance:         Topic Date Due   • Hepatitis C Screening  Never done   • Breast Cancer Screening: Mammogram  Never done   • Colorectal Cancer Screening  Never done         Topic Date Due   • COVID-19 Vaccine (1) Never done   • Pneumococcal Vaccine: 65+ Years (1 - PCV) Never done   • Influenza Vaccine (1) Never done      Medicare Screening Tests and Risk Assessments:         Health Risk Assessment:   Patient rates overall health as excellent. Patient feels that their physical health rating is same. Patient is satisfied with their life. Eyesight was rated as same. Hearing was rated as same. Patient feels that their emotional and mental health rating is same. Patients states they are never, rarely angry. Patient states they are never, rarely unusually tired/fatigued. Pain experienced in the last 7 days has been some. Patient's pain rating has been 3/10. Patient states that she has experienced no weight loss or gain in last 6 months. Depression Screening:   PHQ-2 Score: 0      Fall Risk Screening: In the past year, patient has experienced: no history of falling in past year      Urinary Incontinence Screening:   Patient has not leaked urine accidently in the last six months.      Home Safety:  Patient does not have trouble with stairs inside or outside of their home. Patient has working smoke alarms and has working carbon monoxide detector. Home safety hazards include: none. Nutrition:   Current diet is Regular. Medications:   Patient is currently taking over-the-counter supplements. OTC medications include: see medication list. Patient is able to manage medications. Activities of Daily Living (ADLs)/Instrumental Activities of Daily Living (IADLs):   Walk and transfer into and out of bed and chair?: Yes  Dress and groom yourself?: Yes    Bathe or shower yourself?: Yes    Feed yourself? Yes  Do your laundry/housekeeping?: Yes  Manage your money, pay your bills and track your expenses?: Yes  Make your own meals?: Yes    Do your own shopping?: Yes    Previous Hospitalizations:   Any hospitalizations or ED visits within the last 12 months?: Yes    How many hospitalizations have you had in the last year?: 1-2    Advance Care Planning:   Living will: No    Durable POA for healthcare: No      PREVENTIVE SCREENINGS        Diabetes Screening:     General: Screening Current      Cervical Cancer Screening:    General: Screening Not Indicated      Lung Cancer Screening:     General: Screening Not Indicated    Screening, Brief Intervention, and Referral to Treatment (SBIRT)    Screening  Typical number of drinks in a day: 0  Typical number of drinks in a week: 0  Interpretation: Low risk drinking behavior. Single Item Drug Screening:  How often have you used an illegal drug (including marijuana) or a prescription medication for non-medical reasons in the past year? never    Single Item Drug Screen Score: 0  Interpretation: Negative screen for possible drug use disorder    No results found. Physical Exam:     /84   Pulse 77   Temp 97.5 °F (36.4 °C)   Ht 5' 5" (1.651 m)   Wt 54.9 kg (121 lb)   SpO2 97%   BMI 20.14 kg/m²     Physical Exam  Constitutional:       General: She is not in acute distress. Appearance: Normal appearance.  She is not ill-appearing. HENT:      Head: Normocephalic and atraumatic. Right Ear: Tympanic membrane, ear canal and external ear normal.      Left Ear: Tympanic membrane, ear canal and external ear normal.      Nose: Nose normal.      Mouth/Throat:      Mouth: Mucous membranes are moist.      Pharynx: Oropharynx is clear. No oropharyngeal exudate or posterior oropharyngeal erythema. Eyes:      General: No scleral icterus. Right eye: No discharge. Left eye: No discharge. Extraocular Movements: Extraocular movements intact. Conjunctiva/sclera: Conjunctivae normal.      Pupils: Pupils are equal, round, and reactive to light. Cardiovascular:      Rate and Rhythm: Normal rate and regular rhythm. Pulses: Normal pulses. Heart sounds: Normal heart sounds. No murmur heard. Pulmonary:      Effort: Pulmonary effort is normal. No respiratory distress. Breath sounds: Normal breath sounds. Abdominal:      General: Bowel sounds are normal.      Palpations: Abdomen is soft. Tenderness: There is no abdominal tenderness. Musculoskeletal:         General: Normal range of motion. Cervical back: Normal range of motion and neck supple. Lymphadenopathy:      Cervical: No cervical adenopathy. Skin:     General: Skin is warm and dry. Capillary Refill: Capillary refill takes less than 2 seconds. Neurological:      General: No focal deficit present. Mental Status: She is alert and oriented to person, place, and time. Mental status is at baseline. Cranial Nerves: No cranial nerve deficit.    Psychiatric:         Mood and Affect: Mood normal.          Vineet Fox MD

## 2023-11-20 NOTE — PATIENT INSTRUCTIONS
Medicare Preventive Visit Patient Instructions  Thank you for completing your Welcome to Medicare Visit or Medicare Annual Wellness Visit today. Your next wellness visit will be due in one year (11/20/2024). The screening/preventive services that you may require over the next 5-10 years are detailed below. Some tests may not apply to you based off risk factors and/or age. Screening tests ordered at today's visit but not completed yet may show as past due. Also, please note that scanned in results may not display below. Preventive Screenings:  Service Recommendations Previous Testing/Comments   Colorectal Cancer Screening  * Colonoscopy    * Fecal Occult Blood Test (FOBT)/Fecal Immunochemical Test (FIT)  * Fecal DNA/Cologuard Test  * Flexible Sigmoidoscopy Age: 43-73 years old   Colonoscopy: every 10 years (may be performed more frequently if at higher risk)  OR  FOBT/FIT: every 1 year  OR  Cologuard: every 3 years  OR  Sigmoidoscopy: every 5 years  Screening may be recommended earlier than age 39 if at higher risk for colorectal cancer. Also, an individualized decision between you and your healthcare provider will decide whether screening between the ages of 77-80 would be appropriate. Colonoscopy: Not on file  FOBT/FIT: Not on file  Cologuard: Not on file  Sigmoidoscopy: Not on file          Breast Cancer Screening Age: 36 years old  Frequency: every 1-2 years  Not required if history of left and right mastectomy Mammogram: Not on file        Cervical Cancer Screening Between the ages of 21-29, pap smear recommended once every 3 years. Between the ages of 32-69, can perform pap smear with HPV co-testing every 5 years.    Recommendations may differ for women with a history of total hysterectomy, cervical cancer, or abnormal pap smears in past. Pap Smear: Not on file    Screening Not Indicated   Hepatitis C Screening Once for adults born between [address] [place] [address]  More frequently in patients at high risk for Hepatitis C Hep C Antibody: Not on file        Diabetes Screening 1-2 times per year if you're at risk for diabetes or have pre-diabetes Fasting glucose: 86 mg/dL (9/26/2023)  A1C: 5.3 % (8/31/2023)  Screening Current   Cholesterol Screening Once every 5 years if you don't have a lipid disorder. May order more often based on risk factors. Lipid panel: Not on file          Other Preventive Screenings Covered by Medicare:  Abdominal Aortic Aneurysm (AAA) Screening: covered once if your at risk. You're considered to be at risk if you have a family history of AAA. Lung Cancer Screening: covers low dose CT scan once per year if you meet all of the following conditions: (1) Age 48-67; (2) No signs or symptoms of lung cancer; (3) Current smoker or have quit smoking within the last 15 years; (4) You have a tobacco smoking history of at least 20 pack years (packs per day multiplied by number of years you smoked); (5) You get a written order from a healthcare provider. Glaucoma Screening: covered annually if you're considered high risk: (1) You have diabetes OR (2) Family history of glaucoma OR (3)  aged 48 and older OR (3)  American aged 72 and older  Osteoporosis Screening: covered every 2 years if you meet one of the following conditions: (1) You're estrogen deficient and at risk for osteoporosis based off medical history and other findings; (2) Have a vertebral abnormality; (3) On glucocorticoid therapy for more than 3 months; (4) Have primary hyperparathyroidism; (5) On osteoporosis medications and need to assess response to drug therapy. Last bone density test (DXA Scan): Not on file. HIV Screening: covered annually if you're between the age of 14-79. Also covered annually if you are younger than 13 and older than 72 with risk factors for HIV infection. For pregnant patients, it is covered up to 3 times per pregnancy.     Immunizations:  Immunization Recommendations   Influenza Vaccine Annual influenza vaccination during flu season is recommended for all persons aged >= 6 months who do not have contraindications   Pneumococcal Vaccine   * Pneumococcal conjugate vaccine = PCV13 (Prevnar 13), PCV15 (Vaxneuvance), PCV20 (Prevnar 20)  * Pneumococcal polysaccharide vaccine = PPSV23 (Pneumovax) Adults 60-95 yo with certain risk factors or if 69+ yo  If never received any pneumonia vaccine: recommend Prevnar 20 (PCV20)  Give PCV20 if previously received 1 dose of PCV13 or PPSV23   Hepatitis B Vaccine 3 dose series if at intermediate or high risk (ex: diabetes, end stage renal disease, liver disease)   Respiratory syncytial virus (RSV) Vaccine - COVERED BY MEDICARE PART D  * RSVPreF3 (Arexvy) CDC recommends that adults 61years of age and older may receive a single dose of RSV vaccine using shared clinical decision-making (SCDM)   Tetanus (Td) Vaccine - COST NOT COVERED BY MEDICARE PART B Following completion of primary series, a booster dose should be given every 10 years to maintain immunity against tetanus. Td may also be given as tetanus wound prophylaxis. Tdap Vaccine - COST NOT COVERED BY MEDICARE PART B Recommended at least once for all adults. For pregnant patients, recommended with each pregnancy. Shingles Vaccine (Shingrix) - COST NOT COVERED BY MEDICARE PART B  2 shot series recommended in those 19 years and older who have or will have weakened immune systems or those 50 years and older     Health Maintenance Due:      Topic Date Due   • Hepatitis C Screening  Never done   • Breast Cancer Screening: Mammogram  Never done   • Colorectal Cancer Screening  Never done     Immunizations Due:      Topic Date Due   • COVID-19 Vaccine (1) Never done   • Pneumococcal Vaccine: 65+ Years (1 - PCV) Never done   • Influenza Vaccine (1) Never done     Advance Directives   What are advance directives? Advance directives are legal documents that state your wishes and plans for medical care.  These plans are made ahead of time in case you lose your ability to make decisions for yourself. Advance directives can apply to any medical decision, such as the treatments you want, and if you want to donate organs. What are the types of advance directives? There are many types of advance directives, and each state has rules about how to use them. You may choose a combination of any of the following:  Living will: This is a written record of the treatment you want. You can also choose which treatments you do not want, which to limit, and which to stop at a certain time. This includes surgery, medicine, IV fluid, and tube feedings. Durable power of  for healthcare [facility]): This is a written record that states who you want to make healthcare choices for you when you are unable to make them for yourself. This person, called a proxy, is usually a family member or a friend. You may choose more than 1 proxy. Do not resuscitate (DNR) order:  A DNR order is used in case your heart stops beating or you stop breathing. It is a request not to have certain forms of treatment, such as CPR. A DNR order may be included in other types of advance directives. Medical directive: This covers the care that you want if you are in a coma, near death, or unable to make decisions for yourself. You can list the treatments you want for each condition. Treatment may include pain medicine, surgery, blood transfusions, dialysis, IV or tube feedings, and a ventilator (breathing machine). Values history: This document has questions about your views, beliefs, and how you feel and think about life. This information can help others choose the care that you would choose. Why are advance directives important? An advance directive helps you control your care. Although spoken wishes may be used, it is better to have your wishes written down. Spoken wishes can be misunderstood, or not followed. Treatments may be given even if you do not want them.  An advance directive may make it easier for your family to make difficult choices about your care. © Copyright [employer] 2018 Information is for End User's use only and may not be sold, redistributed or otherwise used for commercial purposes.  All illustrations and images included in CareNotes® are the copyrighted property of A.D.A.M., Inc. or [address] [place] [address]

## 2023-12-02 DIAGNOSIS — T78.40XS ALLERGY, SEQUELA: ICD-10-CM

## 2023-12-02 DIAGNOSIS — R09.81 SINUS CONGESTION: ICD-10-CM

## 2023-12-02 RX ORDER — LORATADINE 10 MG/1
10 TABLET ORAL DAILY
Qty: 90 TABLET | Refills: 1 | Status: SHIPPED | OUTPATIENT
Start: 2023-12-02

## 2023-12-08 ENCOUNTER — SOCIAL WORK (OUTPATIENT)
Dept: BEHAVIORAL/MENTAL HEALTH CLINIC | Facility: CLINIC | Age: 73
End: 2023-12-08
Payer: MEDICARE

## 2023-12-08 DIAGNOSIS — F41.9 ANXIETY: Primary | ICD-10-CM

## 2023-12-08 PROCEDURE — 90834 PSYTX W PT 45 MINUTES: CPT | Performed by: SOCIAL WORKER

## 2023-12-11 NOTE — PSYCH
Behavioral Health Psychotherapy Progress Note    Psychotherapy Provided: Individual Psychotherapy     1. Anxiety            DATA: Therapist met w/pt for individual session. Pt stated she continues to struggle w/anxiety symptoms but hasn't been doing anything to change it. She stated she hasn't gotten herself out of the house and has stayed isolated. Pt stated she has been listening to her body more and not pushing it too much physically. She stated she is staying physically fit and taking care of herself just not overdoing it. Pt stated she has still been having issues w/her marriage but also knows she plays a part and doesn't want to make any changes right now. During this session, this clinician used the following therapeutic modalities: Client-centered Therapy and Cognitive Behavioral Therapy    Substance Abuse was not addressed during this session. If the client is diagnosed with a co-occurring substance use disorder, please indicate any changes in the frequency or amount of use: unknown. Stage of change for addressing substance use diagnoses: No substance use/Not applicable    ASSESSMENT:  Kerstin Soulier presents with a Euthymic/ normal mood. her affect is Normal range and intensity, which is congruent, with her mood and the content of the session. The client has made progress on their goals. Kerstin Soulier presents with a minimal risk of suicide, minimal risk of self-harm, and none risk of harm to others. For any risk assessment that surpasses a "low" rating, a safety plan must be developed. A safety plan was indicated: no  If yes, describe in detail n/a    PLAN: Between sessions, Kerstin Soulier will continue to work on taking steps to manage her anxiety including:  adding some more structure to her days. At the next session, the therapist will use Client-centered Therapy and Cognitive Behavioral Therapy to address symptoms of anxiety/depression.     Behavioral Health Treatment Plan and Discharge Planning: Kan Burton is aware of and agrees to continue to work on their treatment plan. They have identified and are working toward their discharge goals.  yes    Visit start and stop times:    12/8/23  Start Time: 1400  Stop Time: 1448  Total Visit Time: 48 minutes

## 2023-12-14 DIAGNOSIS — F41.9 ANXIETY: ICD-10-CM

## 2023-12-14 RX ORDER — SERTRALINE HYDROCHLORIDE 25 MG/1
25 TABLET, FILM COATED ORAL DAILY
Qty: 135 TABLET | Refills: 1 | Status: SHIPPED | OUTPATIENT
Start: 2023-12-14 | End: 2024-06-11

## 2024-02-02 DIAGNOSIS — F41.9 ANXIETY: ICD-10-CM

## 2024-02-05 RX ORDER — SERTRALINE HYDROCHLORIDE 25 MG/1
25 TABLET, FILM COATED ORAL DAILY
Qty: 135 TABLET | Refills: 0 | Status: SHIPPED | OUTPATIENT
Start: 2024-02-05 | End: 2024-08-03

## 2024-02-28 DIAGNOSIS — F41.9 ANXIETY: ICD-10-CM

## 2024-02-28 RX ORDER — SERTRALINE HYDROCHLORIDE 25 MG/1
25 TABLET, FILM COATED ORAL DAILY
Qty: 180 TABLET | Refills: 0 | Status: SHIPPED | OUTPATIENT
Start: 2024-02-28 | End: 2024-08-26

## 2024-03-08 ENCOUNTER — TELEPHONE (OUTPATIENT)
Dept: FAMILY MEDICINE CLINIC | Facility: CLINIC | Age: 74
End: 2024-03-08

## 2024-03-08 NOTE — TELEPHONE ENCOUNTER
T/c from pt -- states that she went to [facility] for her appt and was told that the is a really bad cataract and that there is nothing they can do about it and referred her somewhere in [place]. Pt does not want to go to [place] and is wondering if there is anywhere else Dr Hanson could suggest.     Please advise   Statement Selected

## 2024-03-11 NOTE — TELEPHONE ENCOUNTER
I do not. We are limited with eye car in [facility], if the pathology warrants it [facility] will send patients to [place] or [place] ([facility] [place])

## 2024-03-11 NOTE — TELEPHONE ENCOUNTER
Called and informed pt -- she stated that she is going to call [facility] for a second opinion as her expreience at [facility] was not great. Pt was left confused and unsure of what was going on.

## 2024-03-22 DIAGNOSIS — Z00.6 ENCOUNTER FOR EXAMINATION FOR NORMAL COMPARISON OR CONTROL IN CLINICAL RESEARCH PROGRAM: ICD-10-CM

## 2024-05-17 ENCOUNTER — RA CDI HCC (OUTPATIENT)
Dept: OTHER | Facility: HOSPITAL | Age: 74
End: 2024-05-17

## 2024-05-20 ENCOUNTER — OFFICE VISIT (OUTPATIENT)
Dept: FAMILY MEDICINE CLINIC | Facility: CLINIC | Age: 74
End: 2024-05-20
Payer: MEDICARE

## 2024-05-20 VITALS
HEIGHT: 65 IN | TEMPERATURE: 97.5 F | BODY MASS INDEX: 20.33 KG/M2 | OXYGEN SATURATION: 95 % | SYSTOLIC BLOOD PRESSURE: 138 MMHG | DIASTOLIC BLOOD PRESSURE: 82 MMHG | HEART RATE: 70 BPM | WEIGHT: 122 LBS

## 2024-05-20 DIAGNOSIS — G89.29 CHRONIC MIDLINE LOW BACK PAIN WITHOUT SCIATICA: ICD-10-CM

## 2024-05-20 DIAGNOSIS — E46 PROTEIN-CALORIE MALNUTRITION, UNSPECIFIED SEVERITY (HCC): ICD-10-CM

## 2024-05-20 DIAGNOSIS — Z98.82 HISTORY OF BREAST IMPLANT: ICD-10-CM

## 2024-05-20 DIAGNOSIS — M41.9 SCOLIOSIS, UNSPECIFIED SCOLIOSIS TYPE, UNSPECIFIED SPINAL REGION: ICD-10-CM

## 2024-05-20 DIAGNOSIS — Z12.11 SCREEN FOR COLON CANCER: ICD-10-CM

## 2024-05-20 DIAGNOSIS — D69.6 THROMBOCYTOPENIA (HCC): ICD-10-CM

## 2024-05-20 DIAGNOSIS — M54.50 CHRONIC MIDLINE LOW BACK PAIN WITHOUT SCIATICA: ICD-10-CM

## 2024-05-20 DIAGNOSIS — M25.562 LEFT KNEE PAIN, UNSPECIFIED CHRONICITY: ICD-10-CM

## 2024-05-20 DIAGNOSIS — Z78.0 ASYMPTOMATIC POSTMENOPAUSAL STATE: ICD-10-CM

## 2024-05-20 DIAGNOSIS — H26.9 CATARACT OF BOTH EYES, UNSPECIFIED CATARACT TYPE: Primary | ICD-10-CM

## 2024-05-20 PROCEDURE — 99214 OFFICE O/P EST MOD 30 MIN: CPT | Performed by: STUDENT IN AN ORGANIZED HEALTH CARE EDUCATION/TRAINING PROGRAM

## 2024-05-20 PROCEDURE — G2211 COMPLEX E/M VISIT ADD ON: HCPCS | Performed by: STUDENT IN AN ORGANIZED HEALTH CARE EDUCATION/TRAINING PROGRAM

## 2024-05-20 NOTE — PATIENT INSTRUCTIONS
Osteoporosis Education   Osteoporosis  is a long-term medical condition that causes your bones to become weak, brittle, and more likely to fracture. Osteoporosis occurs when your body absorbs more bone than it makes. It is also caused by a lack of calcium and estrogen (female hormone).  Common symptoms include the following:  You may not have any signs or symptoms. You may break a bone after a muscle strain, bump, or fall. A break usually occurs in the hip, spine, or wrist. A collapsed vertebra (bone in your spine) may cause severe back pain or loss of height from bent posture.  Call your doctor if:    You have severe pain.   You have increasing pain after a fall.   You have pain when you do your daily activities.   You have questions or concerns about your condition or care.  Diagnosis of osteoporosis:   Blood and urine tests  measure your calcium, vitamin D, and estrogen levels.    An x-ray or CT may show thinned bones or a fracture. You may be given contrast liquid to help the bones show up better in the pictures. Tell the healthcare provider if you have ever had an allergic reaction to contrast liquid. Do not enter the MRI room with anything metal. Metal can cause serious injury. Tell the healthcare provider if you have any metal in or on your body.    A bone density test  compares your bone thickness with what is expected for someone of your age, gender, and ethnicity.  Treatment for osteoporosis may include medicines to prevent bone loss, build new bone, and increase estrogen. These medicines help prevent fractures and may be given as a pill or injection. Ask your healthcare provider for more information on these medicines.  Prevent bone loss:  Eat healthy foods that are high in calcium.  This helps keep your bones strong. Good sources of calcium are milk, cheese, broccoli, tofu, almonds, and canned salmon and sardines. Recommended to get at least 1200mg daily of calcium.  Increase your vitamin D intake.   Vitamin D is in fish oils, some vegetables, and fortified milk, cereal, and bread. Vitamin D is also formed in the skin when it is exposed to the sun. Ask your healthcare provider how much sunlight is safe for you. You will require at least 800 units of vitamin D daily taken as a supplement.  Drink liquids as directed.  Ask your healthcare provider how much liquid to drink each day and which liquids are best for you. Do not have alcohol or caffeine. They decrease bone mineral density, which can weaken your bones.  Exercise regularly.  Ask your healthcare provider about the best exercise plan for you. Weight bearing exercise for 30 minutes, 3 times a week can help build and strengthen bone.  Do not smoke.  Nicotine and other chemicals in cigarettes and cigars can cause lung damage. Ask your healthcare provider for information if you currently smoke and need help to quit. E-cigarettes or smokeless tobacco still contain nicotine. Talk to your healthcare provider before you use these products.  Go to physical therapy as directed.  A physical therapist teaches you exercises to help improve movement and muscle strength.  Alcohol. It is recommended to avoid heavy alcohol use as increased consumption of alcohol is known to cause bone loss  © Copyright [employer] 2021 Information is for End User's use only and may not be sold, redistributed or otherwise used for commercial purposes. All illustrations and images included in CareNotes® are the copyrighted property of [employer] Inc. or [employer]

## 2024-05-20 NOTE — PROGRESS NOTES
Assessment/Plan:         Problem List Items Addressed This Visit          Hematopoietic and Hemostatic    Thrombocytopenia (HCC)       Other    Protein-calorie malnutrition, unspecified severity (HCC)     Other Visit Diagnoses       Cataract of both eyes, unspecified cataract type    -  Primary    Screen for colon cancer        Relevant Orders    Ambulatory Referral to Gastroenterology    Scoliosis, unspecified scoliosis type, unspecified spinal region        Relevant Orders    XR entire spine (scoliosis) 4-5 vw    Asymptomatic postmenopausal state        Relevant Orders    DXA bone density spine hip and pelvis    XR knee 4+ vw left injury    Left knee pain, unspecified chronicity        Relevant Orders    Ambulatory Referral to Physical Therapy    Chronic midline low back pain without sciatica        Relevant Orders    Ambulatory Referral to Physical Therapy                Subjective:      Patient ID: Ange Vaz is a 74 y.o. female.    HPI    Multiple issues she would like to discuss    Eyes: [facility], unhappy with care she received. Was told she may be legally blind and wouldn't have questions answered, told to follow with [facility] in [facility]. Saw Dr Jeremy Wong as the retinal specialist, states the retina is fine but should have the cataract extracted. She will go to a cataract specialist this week at [facility]     Anxiety: zoloft is doing its job but has lots of extra refills    Concerned about turning 75    Back pain, concerned about hx of scoliosis     Screen for osetoporisis    Left knee was injured in an accident 6 months ago. Was sitting on a sofa and dogs caused a coaster to come off the knee and swell up. [id number] ever sicne then has periods of on/off pain, no restriction of movement. Feels crampy at times  is wearing a brace    Had breast implants in 1990,. Not sure if they need to be removed. Was told to exchange in 10 years    The following portions of the patient's  history were reviewed and updated as appropriate:   Past Medical History:  She has a past medical history of History of blood clots.,  _______________________________________________________________________  Medical Problems:  does not have any pertinent problems on file.,  _______________________________________________________________________  Past Surgical History:   has no past surgical history on file.,  _______________________________________________________________________  Family History:  family history includes Cancer in her mother; Heart attack in her father.,  _______________________________________________________________________  Social History:   reports that she has never smoked. She has never used smokeless tobacco. She reports that she does not currently use alcohol. She reports that she does not currently use drugs.,  _______________________________________________________________________  Allergies:  is allergic to aspirin, iodine - food allergy, bee venom, and penicillins..  _______________________________________________________________________  Current Outpatient Medications   Medication Sig Dispense Refill    sertraline (ZOLOFT) 25 mg tablet TAKE 1 TABLET (25 MG TOTAL) BY MOUTH DAILY AFTER 3 WEEKS CAN TAKE 2 TABLETS DAILY (50MG TOTAL) 180 tablet 0    loratadine (CLARITIN) 10 mg tablet TAKE 1 TABLET BY MOUTH EVERY DAY (Patient not taking: Reported on 5/20/2024) 90 tablet 1     No current facility-administered medications for this visit.     _______________________________________________________________________  Review of Systems   Constitutional:  Negative for chills, fatigue and fever.   HENT:  Negative for rhinorrhea and sore throat.    Eyes:  Negative for visual disturbance.   Respiratory:  Negative for cough and shortness of breath.    Cardiovascular:  Negative for chest pain and palpitations.   Gastrointestinal:  Negative for abdominal pain, constipation, diarrhea, nausea and vomiting.  "  Genitourinary:  Negative for difficulty urinating, dysuria and frequency.   Musculoskeletal:  Positive for arthralgias, back pain and myalgias.   Skin:  Negative for color change and rash.   Neurological:  Negative for weakness and headaches.         Objective:  Vitals:    05/20/24 1122   BP: 138/82   Pulse: 70   Temp: 97.5 °F (36.4 °C)   SpO2: 95%   Weight: 55.3 kg (122 lb)   Height: 5' 5\" (1.651 m)     Body mass index is 20.3 kg/m².     Physical Exam  Constitutional:       General: She is not in acute distress.     Appearance: She is not ill-appearing.   HENT:      Head: Normocephalic and atraumatic.      Right Ear: External ear normal.      Left Ear: External ear normal.      Nose: Nose normal. No congestion or rhinorrhea.      Mouth/Throat:      Mouth: Mucous membranes are moist.      Pharynx: Oropharynx is clear. No oropharyngeal exudate or posterior oropharyngeal erythema.   Eyes:      Extraocular Movements: Extraocular movements intact.      Conjunctiva/sclera: Conjunctivae normal.      Pupils: Pupils are equal, round, and reactive to light.   Cardiovascular:      Rate and Rhythm: Normal rate and regular rhythm.      Pulses: Normal pulses.      Heart sounds: No murmur heard.  Pulmonary:      Effort: Pulmonary effort is normal. No respiratory distress.      Breath sounds: Normal breath sounds. No wheezing.   Chest:      Chest wall: No tenderness.   Abdominal:      General: Bowel sounds are normal.      Palpations: Abdomen is soft.      Tenderness: There is no abdominal tenderness.   Musculoskeletal:         General: Normal range of motion.      Cervical back: Normal range of motion.   Skin:     General: Skin is warm and dry.      Capillary Refill: Capillary refill takes less than 2 seconds.      Findings: No rash.   Neurological:      General: No focal deficit present.      Mental Status: She is alert. Mental status is at baseline.         "

## 2024-05-21 ENCOUNTER — TELEPHONE (OUTPATIENT)
Age: 74
End: 2024-05-21

## 2024-05-21 NOTE — TELEPHONE ENCOUNTER
Patient states that she had implants placed back in the 90's, since she has had three sets. They are Saline and size is 700. She states that they feel like they could be deflating and is thinking of having them removed along with reconstruction. No history of Breast Cancer.

## 2024-05-29 DIAGNOSIS — F41.9 ANXIETY: ICD-10-CM

## 2024-05-30 ENCOUNTER — HOSPITAL ENCOUNTER (OUTPATIENT)
Age: 74
Discharge: HOME | End: 2024-05-30
Payer: MEDICARE

## 2024-05-30 VITALS — BODY MASS INDEX: 22.08 KG/M2 | WEIGHT: 120 LBS | HEIGHT: 62 IN

## 2024-05-30 DIAGNOSIS — Z78.0 ASYMPTOMATIC POSTMENOPAUSAL STATE: ICD-10-CM

## 2024-05-30 PROCEDURE — 77080 DXA BONE DENSITY AXIAL: CPT

## 2024-05-30 RX ORDER — SERTRALINE HYDROCHLORIDE 25 MG/1
25 TABLET, FILM COATED ORAL DAILY
Qty: 180 TABLET | Refills: 0 | Status: SHIPPED | OUTPATIENT
Start: 2024-05-30 | End: 2024-11-26

## 2024-06-03 ENCOUNTER — TELEPHONE (OUTPATIENT)
Age: 74
End: 2024-06-03

## 2024-06-03 NOTE — TELEPHONE ENCOUNTER
Dxa scan results and comments seen by pt. She is already taking 1000 IU of vit D so would like to know how much more to take. Pt would also like to know how much and what form of calcium to take daily.

## 2024-06-27 ENCOUNTER — TELEPHONE (OUTPATIENT)
Dept: FAMILY MEDICINE CLINIC | Facility: CLINIC | Age: 74
End: 2024-06-27

## 2024-06-27 ENCOUNTER — CONSULT (OUTPATIENT)
Dept: FAMILY MEDICINE CLINIC | Facility: CLINIC | Age: 74
End: 2024-06-27
Payer: MEDICARE

## 2024-06-27 VITALS
DIASTOLIC BLOOD PRESSURE: 88 MMHG | HEART RATE: 76 BPM | SYSTOLIC BLOOD PRESSURE: 120 MMHG | OXYGEN SATURATION: 93 % | BODY MASS INDEX: 22.38 KG/M2 | WEIGHT: 121.6 LBS | HEIGHT: 62 IN | TEMPERATURE: 97 F | RESPIRATION RATE: 14 BRPM

## 2024-06-27 DIAGNOSIS — Z01.818 PRE-OP EXAMINATION: ICD-10-CM

## 2024-06-27 DIAGNOSIS — H26.9 CATARACT OF BOTH EYES, UNSPECIFIED CATARACT TYPE: Primary | ICD-10-CM

## 2024-06-27 PROCEDURE — 99214 OFFICE O/P EST MOD 30 MIN: CPT | Performed by: STUDENT IN AN ORGANIZED HEALTH CARE EDUCATION/TRAINING PROGRAM

## 2024-06-27 PROCEDURE — G2211 COMPLEX E/M VISIT ADD ON: HCPCS | Performed by: STUDENT IN AN ORGANIZED HEALTH CARE EDUCATION/TRAINING PROGRAM

## 2024-06-27 NOTE — PROGRESS NOTES
Family MEDICINE PRE-OPERATIVE EVALUATION  [facility] [address] [facility]    NAME: Ange Vaz  AGE: 74 y.o. SEX: female  : 1950     DATE: 2024     Internal Medicine Pre-Operative Evaluation:     Chief Complaint: Pre-operative Evaluation     Surgery: cataract extraction b/l  Anticipated Date of Surgery: 2024 and 2024  Referring Provider: Victor Hugo Ibarra MD        History of Present Illness:     Ange Vaz is a 74 y.o. female who presents to the office today for a preoperative consultation at the request of surgeon, Dr Ibarra, who plans on performing cataract extraction on  and . Planned anesthesia is  MAC . Patient has a bleeding risk of: no recent abnormal bleeding, no remote history of abnormal bleeding, and no use of Ca-channel blockers. Patient does not have objections to receiving blood products if needed. Current anti-platelet/anti-coagulation medications that the patient is prescribed includes:  n/a .      Assessment of Chronic Conditions:   - n/a     Assessment of Cardiac Risk:  Denies unstable or severe angina or MI in the last 6 weeks or history of stent placement in the last year   Denies decompensated heart failure (e.g. New onset heart failure, NYHA functional class IV heart failure, or worsening existing heart failure)  Denies significant arrhythmias such as high grade AV block, symptomatic ventricular arrhythmia, newly recognized ventricular tachycardia, supraventricular tachycardia with resting heart rate >100, or symptomatic bradycardia  Denies severe heart valve disease including aortic stenosis or symptomatic mitral stenosis     Exercise Capacity:  Able to walk 4 blocks without symptoms?: Yes  Able to walk 2 flights without symptoms?: Yes    Prior Anesthesia Reactions: No     Personal history of venous thromboembolic disease? No    History of steroid use for >2 weeks within last year? No      Review of  Systems:     Review of Systems   Constitutional:  Negative for chills, fatigue and fever.   HENT:  Negative for rhinorrhea and sore throat.    Eyes:  Negative for visual disturbance.   Respiratory:  Negative for cough and shortness of breath.    Cardiovascular:  Negative for chest pain and palpitations.   Gastrointestinal:  Negative for abdominal pain, constipation, diarrhea, nausea and vomiting.   Genitourinary:  Negative for difficulty urinating, dysuria and frequency.   Musculoskeletal:  Negative for arthralgias and myalgias.   Skin:  Negative for color change and rash.   Neurological:  Negative for weakness and headaches.        Problem List:     Patient Active Problem List   Diagnosis   • Stress due to marital problems   • Excess sun exposure   • Protein-calorie malnutrition, unspecified severity (HCC)   • Thrombocytopenia (HCC)   • Anxiety        Allergies:     Allergies   Allergen Reactions   • Aspirin Wheezing   • Iodine - Food Allergy Anaphylaxis   • Bee Venom Swelling   • Penicillins Hives        Current Medications:       Current Outpatient Medications:   •  sertraline (ZOLOFT) 25 mg tablet, TAKE 1 TABLET (25 MG TOTAL) BY MOUTH DAILY AFTER 3 WEEKS CAN TAKE 2 TABLETS DAILY (50MG TOTAL), Disp: 180 tablet, Rfl: 0  •  loratadine (CLARITIN) 10 mg tablet, TAKE 1 TABLET BY MOUTH EVERY DAY (Patient not taking: Reported on 5/20/2024), Disp: 90 tablet, Rfl: 1     Past History:     Past Medical History:   Diagnosis Date   • History of blood clots         No past surgical history on file.     Family History   Problem Relation Age of Onset   • Cancer Mother    • Heart attack Father         Social History     Socioeconomic History   • Marital status: /Civil Union     Spouse name: Not on file   • Number of children: Not on file   • Years of education: Not on file   • Highest education level: Not on file   Occupational History   • Not on file   Tobacco Use   • Smoking status: Never   • Smokeless tobacco: Never  "  Vaping Use   • Vaping status: Never Used   Substance and Sexual Activity   • Alcohol use: Not Currently   • Drug use: Not Currently   • Sexual activity: Not Currently   Other Topics Concern   • Not on file   Social History Narrative   • Not on file     Social Determinants of Health     Financial Resource Strain: Low Risk  (11/20/2023)    Overall Financial Resource Strain (CARDIA)    • Difficulty of Paying Living Expenses: Not hard at all   Food Insecurity: No Food Insecurity (8/31/2023)    [facility]    Hunger Vital Sign    • Worried About Running Out of Food in the Last Year: Never true    • Ran Out of Food in the Last Year: Never true   Transportation Needs: No Transportation Needs (11/20/2023)    PRAPARE - Transportation    • Lack of Transportation (Medical): No    • Lack of Transportation (Non-Medical): No   Physical Activity: Not on file   Stress: Not on file   Social Connections: Not on file   Intimate Partner Violence: Not At Risk (8/31/2023)    [facility]    Humiliation, Afraid, Rape, and Kick questionnaire    • Fear of Current or Ex-Partner: No    • Emotionally Abused: No    • Physically Abused: No    • Sexually Abused: No   Housing Stability: Low Risk  (8/31/2023)    [facility]    Housing Stability Vital Sign    • Unable to Pay for Housing in the Last Year: No    • Number of Places Lived in the Last Year: 2    • Unstable Housing in the Last Year: No        Physical Exam:      /88   Pulse 76   Temp (!) 97 °F (36.1 °C)   Resp 14   Ht 5' 2\" (1.575 m)   Wt 55.2 kg (121 lb 9.6 oz)   SpO2 93%   BMI 22.24 kg/m²     Physical Exam  Constitutional:       General: She is not in acute distress.     Appearance: She is not ill-appearing.   HENT:      Head: Normocephalic and atraumatic.      Right Ear: External ear normal. "      Left Ear: External ear normal.      Nose: Nose normal. No congestion or rhinorrhea.      Mouth/Throat:      Mouth: Mucous membranes are moist.      Pharynx: Oropharynx is clear. No oropharyngeal exudate or posterior oropharyngeal erythema.   Eyes:      Extraocular Movements: Extraocular movements intact.      Conjunctiva/sclera: Conjunctivae normal.      Pupils: Pupils are equal, round, and reactive to light.   Cardiovascular:      Rate and Rhythm: Normal rate and regular rhythm.      Pulses: Normal pulses.      Heart sounds: No murmur heard.  Pulmonary:      Effort: Pulmonary effort is normal. No respiratory distress.      Breath sounds: Normal breath sounds. No wheezing.   Chest:      Chest wall: No tenderness.   Abdominal:      General: Bowel sounds are normal.      Palpations: Abdomen is soft.      Tenderness: There is no abdominal tenderness.   Musculoskeletal:         General: Normal range of motion.      Cervical back: Normal range of motion.   Skin:     General: Skin is warm and dry.      Capillary Refill: Capillary refill takes less than 2 seconds.      Findings: No rash.   Neurological:      General: No focal deficit present.      Mental Status: She is alert. Mental status is at baseline.           Data:          Assessment:     1. Cataract of both eyes, unspecified cataract type        2. Pre-op examination             Plan:     74 y.o. female with planned surgery:       Cardiac Risk Estimation: per the Revised Cardiac Risk Index (Circ. 100:1043, 1999), the patient's risk factors for cardiac complications include  n/a , putting her in: RCI RISK CLASS I (0 risk factors, risk of major cardiac compl. appr. 0.5%).    1. Further preoperative workup as follows:   - None; no further preoperative work-up is required    2. Medication Management/Recommendations:   - None, continue medication regimen including morning of surgery, with sip of water    3. Prophylaxis for cardiac events with perioperative  beta-blockers: not indicated.    4. Patient requires further consultation with: None    Clearance  Patient is MEDICALLY OPTIMIZED  for surgery without any additional cardiac testing.     Silvia Hanson MD  [address] [address]  Phone#  [phone]  Fax#  [phone]

## 2024-07-30 ENCOUNTER — COSMETIC (OUTPATIENT)
Age: 74
End: 2024-07-30

## 2024-07-30 VITALS
TEMPERATURE: 97.6 F | BODY MASS INDEX: 22.29 KG/M2 | HEART RATE: 63 BPM | DIASTOLIC BLOOD PRESSURE: 81 MMHG | SYSTOLIC BLOOD PRESSURE: 163 MMHG | WEIGHT: 121.13 LBS | HEIGHT: 62 IN

## 2024-07-30 DIAGNOSIS — Z98.82 HISTORY OF BREAST IMPLANT: ICD-10-CM

## 2024-07-30 PROCEDURE — COSCON COSMETIC CONSULTATION: Performed by: PLASTIC SURGERY

## 2024-07-30 NOTE — PROGRESS NOTES
Patient is a 74-year-old female who is here today to discuss management of her bilateral breast implants.  Patient states that she feels that her implants are beginning to bottom out.  She also has breast asymmetry with her right breast being high and tight compared to her left breast.  The patient has had multiple surgeries on her breast, namely implants, capsular contracture, and capsulectomies.  She is here today to discuss possible management options.  She is possibly interested in removal.  Of note the patient does have a history of a blood clot.  She states that she is not up-to-date on her mammograms.  She is a non-smoker.  She states these are saline implants, submuscular implants, 650 mL she does have a family history of breast cancer.    Physical exam--bilateral breast--grade 2 ptosis, the breast parenchyma is lower on the implant, she has grade 3 capsular contracture with tenderness on the right breast leading to significant breast asymmetry, she has grade 2 capsular contracture on the left breast.  I do not appreciate any masses or adenopathy    Measurements- R- SN 24 NIMF 9 BW 15                             L- SN 26 NIMF10 BW 15 NN 20    Discussion--I had a lengthy discussion with the patient regarding her options.  I discussed with her that option 1 would be continued conservative management, however because the patient has significant discomfort I would recommend removal and capsulectomy.  We did discuss multiple options, because the patient has saline implants, my recommendation would be for preoperative deflation, this would allow for accurate assessment of her breast volume and planning for further surgery.  I discussed this will also give the tissue time to recover from a blood flow standpoint.  My recommendation at that point would be for removal of the bilateral implants with capsulectomy and likely lift.  Patient states that she feels that she will not be happy with implant removal alone.  The  patient is also inquiring as to possible replacement of her implants.  I discussed with her I would not recommend this at the initial stages the patient has had several surgeries on her breast before leading her much more likely to experience things like wound healing complication of nipple necrosis.  I discussed that a staged plan with possible augmentation down the road approximately 3 months later would be a viable option.  Because the patient does have some significant pain and discomfort and tenderness on the right breast I do feel that there is a medically necessary component to her capsular contracture.  All the patient's questions were answered to her satisfaction, we will be providing her with pricing information and literature, pictures were taken today, I do feel it warranted to submit the capsulectomy portion for insurance authorization.  I also discussed and stressed the importance of mammography, the patient will absolutely need a mammogram prior to surgery particular the patient does have a family history of breast cancer.  She will also likely need extended Lovenox therapy.

## 2024-08-06 ENCOUNTER — TELEPHONE (OUTPATIENT)
Age: 74
End: 2024-08-06

## 2024-08-06 NOTE — TELEPHONE ENCOUNTER
I would monitor it as it may bve transitional. Unless patient is losing visible chunks of hair (circular lesions, bald spot, etc) as it may be realted to adjust medication for thyroid, stress, etc

## 2024-08-06 NOTE — TELEPHONE ENCOUNTER
Patient called back stating the only medication she is on is Zoloft and has been on that for a long time and has never had a thyroid problems..She is asking if Dr. Hanson would upload a lab slip to see if there is anything irregular. Please let the patient know.

## 2024-08-06 NOTE — TELEPHONE ENCOUNTER
Patient called with concerns, she states she's been experiencing a significant amount of hair loss over the past couple of weeks. She states she is not sick. She is not sure if it has anything to do with the Zoloft she is taking which has been working for her but she is aware that hair loss can be one of it's side effects. She has an appt. With Dermatology in November but would like to know if she should see pcp? Or what he would advise? Patient would like pcp to order labs for her. Patient can be reached at [phone]

## 2024-08-21 ENCOUNTER — TELEPHONE (OUTPATIENT)
Age: 74
End: 2024-08-21

## 2024-08-21 ENCOUNTER — RA CDI HCC (OUTPATIENT)
Dept: OTHER | Facility: HOSPITAL | Age: 74
End: 2024-08-21

## 2024-08-21 NOTE — TELEPHONE ENCOUNTER
SW pt, explained that before we can submit anything to insurance we need an up to date mammogram. Pt will schedule one and be in touch as soon as she gets one done.

## 2024-08-21 NOTE — TELEPHONE ENCOUNTER
Received call from patient stating she came in on 07/30/2024 and saw Dr Allison.    Patient stated Dr Allison told her someone will call her in about two weeks letting her know if her insurance will cover any if not all of it.    Patient would like a call back

## 2024-08-29 ENCOUNTER — OFFICE VISIT (OUTPATIENT)
Dept: FAMILY MEDICINE CLINIC | Facility: CLINIC | Age: 74
End: 2024-08-29
Payer: MEDICARE

## 2024-08-29 VITALS
HEIGHT: 62 IN | SYSTOLIC BLOOD PRESSURE: 140 MMHG | DIASTOLIC BLOOD PRESSURE: 90 MMHG | HEART RATE: 70 BPM | OXYGEN SATURATION: 94 % | BODY MASS INDEX: 21.9 KG/M2 | WEIGHT: 119 LBS | TEMPERATURE: 96.4 F | RESPIRATION RATE: 16 BRPM

## 2024-08-29 DIAGNOSIS — L65.9 HAIR LOSS: Primary | ICD-10-CM

## 2024-08-29 DIAGNOSIS — K14.6 TONGUE BURNING SENSATION: ICD-10-CM

## 2024-08-29 DIAGNOSIS — Z12.31 ENCOUNTER FOR SCREENING MAMMOGRAM FOR MALIGNANT NEOPLASM OF BREAST: ICD-10-CM

## 2024-08-29 DIAGNOSIS — T78.40XS ALLERGY, UNSPECIFIED, SEQUELA: ICD-10-CM

## 2024-08-29 DIAGNOSIS — E64.0 SEQUELAE OF PROTEIN-CALORIE MALNUTRITION (HCC): ICD-10-CM

## 2024-08-29 DIAGNOSIS — R53.83 FATIGUE, UNSPECIFIED TYPE: ICD-10-CM

## 2024-08-29 DIAGNOSIS — F41.9 ANXIETY: ICD-10-CM

## 2024-08-29 DIAGNOSIS — J30.2 SEASONAL ALLERGIES: ICD-10-CM

## 2024-08-29 DIAGNOSIS — R21 RASH: ICD-10-CM

## 2024-08-29 DIAGNOSIS — E43 UNSPECIFIED SEVERE PROTEIN-CALORIE MALNUTRITION (HCC): ICD-10-CM

## 2024-08-29 PROCEDURE — 99214 OFFICE O/P EST MOD 30 MIN: CPT | Performed by: STUDENT IN AN ORGANIZED HEALTH CARE EDUCATION/TRAINING PROGRAM

## 2024-08-29 PROCEDURE — G2211 COMPLEX E/M VISIT ADD ON: HCPCS | Performed by: STUDENT IN AN ORGANIZED HEALTH CARE EDUCATION/TRAINING PROGRAM

## 2024-08-29 RX ORDER — KETOCONAZOLE 20 MG/ML
1 SHAMPOO TOPICAL 2 TIMES WEEKLY
Qty: 120 ML | Refills: 1 | Status: SHIPPED | OUTPATIENT
Start: 2024-08-29

## 2024-08-29 RX ORDER — SERTRALINE HYDROCHLORIDE 25 MG/1
25 TABLET, FILM COATED ORAL DAILY
Qty: 180 TABLET | Refills: 1 | Status: SHIPPED | OUTPATIENT
Start: 2024-08-29 | End: 2025-02-25

## 2024-08-29 NOTE — PROGRESS NOTES
Assessment/Plan:         Problem List Items Addressed This Visit    None  Visit Diagnoses     Hair loss    -  Primary    Relevant Medications    ketoconazole (NIZORAL) 2 % shampoo    Other Relevant Orders    Vitamin B12    TSH + Free T4    Folate    Magnesium    Comprehensive metabolic panel    Encounter for screening mammogram for malignant neoplasm of breast        Relevant Orders    Mammo screening bilateral w 3d & cad    Fatigue, unspecified type        Relevant Orders    Vitamin B12    TSH + Free T4    Folate    Magnesium    Comprehensive metabolic panel    Iron Panel (Includes Ferritin, Iron Sat%, Iron, and TIBC)    CBC and differential    Rash        Relevant Medications    ketoconazole (NIZORAL) 2 % shampoo    Tongue burning sensation        Relevant Orders    Vitamin B12    Magnesium    Comprehensive metabolic panel    Seasonal allergies        Relevant Orders    [facility]ergy Panel, Adult    Sequelae of protein-calorie malnutrition (HCC)        Relevant Orders    Folate    Allergy, unspecified, sequela        Relevant Orders    [facility] Allergy Panel, Adult    Unspecified severe protein-calorie malnutrition (HCC)        Relevant Orders    Iron Panel (Includes Ferritin, Iron Sat%, Iron, and TIBC)        Likely fungal related on the head, ketoconazole shampoo sent in. Take pictures of the rash. Tongue burning can be multifactorial, start to increase supplementation wit healthy fats    Subjective:      Patient ID: Ange Vaz is a 74 y.o. female.    Rash  Pertinent negatives include no congestion, cough, fatigue, fever, rhinorrhea, shortness of breath, sore throat or vomiting.       Multiple symptoms    Hair loss: getting clumps coming out at the     Fatigue: nothing new in life, unsure why. States she is sleeping fine    Tongue burns at times: entire thing, no new suppliments    Rashes: come and go, has appointment with derm.    The following portions of the patient's history were reviewed and  updated as appropriate:   Past Medical History:  She has a past medical history of Deep vein thrombosis (HCC) (2019) and History of blood clots.,  _______________________________________________________________________  Medical Problems:  does not have any pertinent problems on file.,  _______________________________________________________________________  Past Surgical History:   has a past surgical history that includes Blepharoplasty (1995); Cosmetic surgery (1990); Augmentation mammaplasty (1990); Abdominoplasty; Liposuction; and Facelift.,  _______________________________________________________________________  Family History:  family history includes Breast cancer in her mother; Cancer in her mother; Heart attack in her father; Hypertension in her father.,  _______________________________________________________________________  Social History:   reports that she has never smoked. She has never been exposed to tobacco smoke. She has never used smokeless tobacco. She reports that she does not currently use alcohol. She reports that she does not use drugs.,  _______________________________________________________________________  Allergies:  is allergic to aspirin, iodine - food allergy, bee venom, and penicillins..  _______________________________________________________________________  Current Outpatient Medications   Medication Sig Dispense Refill   • ketoconazole (NIZORAL) 2 % shampoo Apply 1 Application topically 2 (two) times a week 120 mL 1   • loratadine (CLARITIN) 10 mg tablet TAKE 1 TABLET BY MOUTH EVERY DAY 90 tablet 1   • sertraline (ZOLOFT) 25 mg tablet TAKE 1 TABLET (25 MG TOTAL) BY MOUTH DAILY AFTER 3 WEEKS CAN TAKE 2 TABLETS DAILY (50MG TOTAL) 180 tablet 0     No current facility-administered medications for this visit.     _______________________________________________________________________  Review of Systems   Constitutional:  Negative for activity change, appetite change, fatigue and  "fever.   HENT:  Negative for congestion, rhinorrhea and sore throat.    Eyes:  Negative for visual disturbance.   Respiratory:  Negative for cough and shortness of breath.    Cardiovascular:  Negative for chest pain.   Gastrointestinal:  Negative for nausea and vomiting.   Skin:  Positive for rash.         Objective:  Vitals:    08/29/24 0934   BP: 140/90   Pulse: 70   Resp: 16   Temp: (!) 96.4 °F (35.8 °C)   SpO2: 94%   Weight: 54 kg (119 lb)   Height: 5' 2\" (1.575 m)     Body mass index is 21.77 kg/m².     Physical Exam  Constitutional:       Appearance: Normal appearance.   HENT:      Head: Normocephalic and atraumatic.      Nose: Nose normal.      Mouth/Throat:      Lips: Pink.      Mouth: Mucous membranes are moist.      Tongue: No lesions. Tongue does not deviate from midline.      Pharynx: Oropharynx is clear.   Neurological:      Mental Status: She is alert.           "

## 2024-08-31 ENCOUNTER — APPOINTMENT (OUTPATIENT)
Age: 74
End: 2024-08-31
Payer: MEDICARE

## 2024-08-31 DIAGNOSIS — T78.40XS ALLERGY, UNSPECIFIED, SEQUELA: ICD-10-CM

## 2024-08-31 DIAGNOSIS — K14.6 TONGUE BURNING SENSATION: ICD-10-CM

## 2024-08-31 DIAGNOSIS — R53.83 FATIGUE, UNSPECIFIED TYPE: ICD-10-CM

## 2024-08-31 DIAGNOSIS — E64.0 SEQUELAE OF PROTEIN-CALORIE MALNUTRITION (HCC): ICD-10-CM

## 2024-08-31 DIAGNOSIS — L65.9 HAIR LOSS: ICD-10-CM

## 2024-08-31 DIAGNOSIS — E43 UNSPECIFIED SEVERE PROTEIN-CALORIE MALNUTRITION (HCC): ICD-10-CM

## 2024-08-31 DIAGNOSIS — J30.2 SEASONAL ALLERGIES: ICD-10-CM

## 2024-08-31 LAB
ALBUMIN SERPL BCG-MCNC: 4.3 G/DL (ref 3.5–5)
ALP SERPL-CCNC: 98 U/L (ref 34–104)
ALT SERPL W P-5'-P-CCNC: 16 U/L (ref 7–52)
ANION GAP SERPL CALCULATED.3IONS-SCNC: 7 MMOL/L (ref 4–13)
AST SERPL W P-5'-P-CCNC: 22 U/L (ref 13–39)
BASOPHILS # BLD AUTO: 0.05 THOUSANDS/ÂΜL (ref 0–0.1)
BASOPHILS NFR BLD AUTO: 1 % (ref 0–1)
BILIRUB SERPL-MCNC: 0.48 MG/DL (ref 0.2–1)
BUN SERPL-MCNC: 14 MG/DL (ref 5–25)
CALCIUM SERPL-MCNC: 9.7 MG/DL (ref 8.4–10.2)
CHLORIDE SERPL-SCNC: 102 MMOL/L (ref 96–108)
CO2 SERPL-SCNC: 29 MMOL/L (ref 21–32)
CREAT SERPL-MCNC: 0.7 MG/DL (ref 0.6–1.3)
EOSINOPHIL # BLD AUTO: 0.19 THOUSAND/ÂΜL (ref 0–0.61)
EOSINOPHIL NFR BLD AUTO: 3 % (ref 0–6)
ERYTHROCYTE [DISTWIDTH] IN BLOOD BY AUTOMATED COUNT: 12.3 % (ref 11.6–15.1)
FERRITIN SERPL-MCNC: 48 NG/ML (ref 11–307)
FOLATE SERPL-MCNC: >22.3 NG/ML
GFR SERPL CREATININE-BSD FRML MDRD: 85 ML/MIN/1.73SQ M
GLUCOSE P FAST SERPL-MCNC: 92 MG/DL (ref 65–99)
HCT VFR BLD AUTO: 45.9 % (ref 34.8–46.1)
HGB BLD-MCNC: 14.9 G/DL (ref 11.5–15.4)
IMM GRANULOCYTES # BLD AUTO: 0.02 THOUSAND/UL (ref 0–0.2)
IMM GRANULOCYTES NFR BLD AUTO: 0 % (ref 0–2)
IRON SATN MFR SERPL: 25 % (ref 15–50)
IRON SERPL-MCNC: 79 UG/DL (ref 50–212)
LYMPHOCYTES # BLD AUTO: 1.61 THOUSANDS/ÂΜL (ref 0.6–4.47)
LYMPHOCYTES NFR BLD AUTO: 27 % (ref 14–44)
MAGNESIUM SERPL-MCNC: 2.3 MG/DL (ref 1.9–2.7)
MCH RBC QN AUTO: 30.8 PG (ref 26.8–34.3)
MCHC RBC AUTO-ENTMCNC: 32.5 G/DL (ref 31.4–37.4)
MCV RBC AUTO: 95 FL (ref 82–98)
MONOCYTES # BLD AUTO: 0.47 THOUSAND/ÂΜL (ref 0.17–1.22)
MONOCYTES NFR BLD AUTO: 8 % (ref 4–12)
NEUTROPHILS # BLD AUTO: 3.71 THOUSANDS/ÂΜL (ref 1.85–7.62)
NEUTS SEG NFR BLD AUTO: 61 % (ref 43–75)
NRBC BLD AUTO-RTO: 0 /100 WBCS
PLATELET # BLD AUTO: 216 THOUSANDS/UL (ref 149–390)
PMV BLD AUTO: 11 FL (ref 8.9–12.7)
POTASSIUM SERPL-SCNC: 4.9 MMOL/L (ref 3.5–5.3)
PROT SERPL-MCNC: 6.9 G/DL (ref 6.4–8.4)
RBC # BLD AUTO: 4.83 MILLION/UL (ref 3.81–5.12)
SODIUM SERPL-SCNC: 138 MMOL/L (ref 135–147)
T4 FREE SERPL-MCNC: 0.85 NG/DL (ref 0.61–1.12)
TIBC SERPL-MCNC: 321 UG/DL (ref 250–450)
TSH SERPL DL<=0.05 MIU/L-ACNC: 4.65 UIU/ML (ref 0.45–4.5)
UIBC SERPL-MCNC: 242 UG/DL (ref 155–355)
VIT B12 SERPL-MCNC: 3213 PG/ML (ref 180–914)
WBC # BLD AUTO: 6.05 THOUSAND/UL (ref 4.31–10.16)

## 2024-08-31 PROCEDURE — 36415 COLL VENOUS BLD VENIPUNCTURE: CPT

## 2024-08-31 PROCEDURE — 84439 ASSAY OF FREE THYROXINE: CPT

## 2024-08-31 PROCEDURE — 83540 ASSAY OF IRON: CPT

## 2024-08-31 PROCEDURE — 80053 COMPREHEN METABOLIC PANEL: CPT

## 2024-08-31 PROCEDURE — 85025 COMPLETE CBC W/AUTO DIFF WBC: CPT

## 2024-08-31 PROCEDURE — 82728 ASSAY OF FERRITIN: CPT

## 2024-08-31 PROCEDURE — 82746 ASSAY OF FOLIC ACID SERUM: CPT

## 2024-08-31 PROCEDURE — 83550 IRON BINDING TEST: CPT

## 2024-08-31 PROCEDURE — 82607 VITAMIN B-12: CPT

## 2024-08-31 PROCEDURE — 83735 ASSAY OF MAGNESIUM: CPT

## 2024-08-31 PROCEDURE — 86003 ALLG SPEC IGE CRUDE XTRC EA: CPT

## 2024-08-31 PROCEDURE — 82785 ASSAY OF IGE: CPT

## 2024-08-31 PROCEDURE — 84443 ASSAY THYROID STIM HORMONE: CPT

## 2024-09-03 LAB
A ALTERNATA IGE QN: 1.25 KUA/I
A FUMIGATUS IGE QN: <0.1 KUA/I
BERMUDA GRASS IGE QN: 1.05 KUA/I
BOXELDER IGE QN: 0.92 KUA/I
C HERBARUM IGE QN: <0.1 KUA/I
CAT DANDER IGE QN: 0.12 KUA/I
CMN PIGWEED IGE QN: 0.82 KUA/I
COMMON RAGWEED IGE QN: 1 KUA/I
COTTONWOOD IGE QN: 0.96 KUA/I
D FARINAE IGE QN: 0.11 KUA/I
D PTERONYSS IGE QN: <0.1 KUA/I
DOG DANDER IGE QN: 0.73 KUA/I
LONDON PLANE IGE QN: 0.99 KUA/I
MOUSE URINE PROT IGE QN: <0.1 KUA/I
MT JUNIPER IGE QN: 0.78 KUA/I
MUGWORT IGE QN: 0.79 KUA/I
P NOTATUM IGE QN: <0.1 KUA/I
ROACH IGE QN: 0.42 KUA/I
SHEEP SORREL IGE QN: 0.98 KUA/I
SILVER BIRCH IGE QN: 0.75 KUA/I
TIMOTHY IGE QN: 3.25 KUA/I
TOTAL IGE SMQN RAST: 138 KU/L (ref 0–113)
WALNUT IGE QN: 1.02 KUA/I
WHITE ASH IGE QN: 2.12 KUA/I
WHITE ELM IGE QN: 1.04 KUA/I
WHITE MULBERRY IGE QN: 0.55 KUA/I
WHITE OAK IGE QN: 0.97 KUA/I

## 2024-09-10 ENCOUNTER — TELEPHONE (OUTPATIENT)
Age: 74
End: 2024-09-10

## 2024-09-10 NOTE — TELEPHONE ENCOUNTER
Called Dr. Mcgee [phone], left message with RN to see if they do video strobscopy. Has not returned call yet.     Called Dr. Celis [phone], yes they do the testing. Order can be faxed to [phone].     Can we send the order to Dr. Celis, if so please order   Patient has tested positive for COVID.  Symptoms started 3 days ago - says at one point she had fever as high as 105 but normal now.  Body aches, chest congestion that she can't bring up.  She is asking if there is something she can take (I.e. an expectorant) that can help bring up what's in her chest.  Please contact patient and advise.  Thank you.

## 2024-09-10 NOTE — TELEPHONE ENCOUNTER
Would take Tylenol Motrin staggered to help with alleviation of symptoms, further congestion Mucinex and honey would also be excellent.  Medication such as Paxlovid would not be unreasonable given her age but would need a virtual appointment for that per current guidelines

## 2024-09-24 ENCOUNTER — HOSPITAL ENCOUNTER (OUTPATIENT)
Age: 74
Discharge: HOME | End: 2024-09-24
Payer: MEDICARE

## 2024-09-24 VITALS — WEIGHT: 119 LBS | HEIGHT: 62 IN | BODY MASS INDEX: 21.9 KG/M2

## 2024-09-24 DIAGNOSIS — Z12.31 ENCOUNTER FOR SCREENING MAMMOGRAM FOR MALIGNANT NEOPLASM OF BREAST: ICD-10-CM

## 2024-09-24 PROCEDURE — 77063 BREAST TOMOSYNTHESIS BI: CPT

## 2024-09-24 PROCEDURE — 77067 SCR MAMMO BI INCL CAD: CPT

## 2024-09-27 ENCOUNTER — TELEPHONE (OUTPATIENT)
Age: 74
End: 2024-09-27

## 2024-09-27 NOTE — TELEPHONE ENCOUNTER
Pt calling stating she completed her mammogram on 9/24, and would like to move forward with scheduling surgery with Dr Allison    Pt expressed wanting to submit to insurance, and see if they will cover it or not, possible capsular contracture    Advised we will be in touch, call pt back at [phone]

## 2024-10-11 NOTE — TELEPHONE ENCOUNTER
SW pt, unable to submit prior auth due to medicare being primary. Explained ABN vs cosmetic quote. Read medical policy from medicare, policy is vague and doesn't say what grade capsular contracture is required for coverage. Told pt I would do some research and call her back with more information. Pt very thankful for my call

## 2024-10-22 ENCOUNTER — TELEPHONE (OUTPATIENT)
Age: 74
End: 2024-10-22

## 2024-10-22 DIAGNOSIS — R79.89 ABNORMAL TSH: Primary | ICD-10-CM

## 2024-10-22 DIAGNOSIS — R94.6 ABNORMAL RESULTS OF THYROID FUNCTION STUDIES: ICD-10-CM

## 2024-10-22 NOTE — TELEPHONE ENCOUNTER
Patient was asked to retest for her TSH within 6-8 weeks.  This is the 7th week - patient went to the lab but there was no order.  Please add order - she plans to go again on Friday.  Contact patient to let her know when its in and if fasting is required.  Thank you.

## 2024-10-29 ENCOUNTER — APPOINTMENT (OUTPATIENT)
Age: 74
End: 2024-10-29
Payer: MEDICARE

## 2024-10-29 DIAGNOSIS — R94.6 ABNORMAL RESULTS OF THYROID FUNCTION STUDIES: ICD-10-CM

## 2024-10-29 DIAGNOSIS — R79.89 ABNORMAL TSH: ICD-10-CM

## 2024-10-29 LAB — TSH SERPL DL<=0.05 MIU/L-ACNC: 3.44 UIU/ML (ref 0.45–4.5)

## 2024-10-29 PROCEDURE — 36415 COLL VENOUS BLD VENIPUNCTURE: CPT

## 2024-10-29 PROCEDURE — 84443 ASSAY THYROID STIM HORMONE: CPT

## 2024-11-04 ENCOUNTER — OFFICE VISIT (OUTPATIENT)
Dept: FAMILY MEDICINE CLINIC | Facility: CLINIC | Age: 74
End: 2024-11-04
Payer: MEDICARE

## 2024-11-04 VITALS
HEART RATE: 75 BPM | WEIGHT: 116 LBS | HEIGHT: 62 IN | TEMPERATURE: 97.5 F | OXYGEN SATURATION: 95 % | DIASTOLIC BLOOD PRESSURE: 82 MMHG | SYSTOLIC BLOOD PRESSURE: 136 MMHG | BODY MASS INDEX: 21.35 KG/M2

## 2024-11-04 DIAGNOSIS — F41.9 ANXIETY: ICD-10-CM

## 2024-11-04 DIAGNOSIS — R53.83 OTHER FATIGUE: ICD-10-CM

## 2024-11-04 DIAGNOSIS — R42 DIZZINESS: ICD-10-CM

## 2024-11-04 DIAGNOSIS — M25.50 POLYARTHRALGIA: ICD-10-CM

## 2024-11-04 DIAGNOSIS — L65.9 HAIR LOSS: Primary | ICD-10-CM

## 2024-11-04 DIAGNOSIS — R21 RASH: ICD-10-CM

## 2024-11-04 PROCEDURE — 99214 OFFICE O/P EST MOD 30 MIN: CPT | Performed by: STUDENT IN AN ORGANIZED HEALTH CARE EDUCATION/TRAINING PROGRAM

## 2024-11-04 PROCEDURE — G2211 COMPLEX E/M VISIT ADD ON: HCPCS | Performed by: STUDENT IN AN ORGANIZED HEALTH CARE EDUCATION/TRAINING PROGRAM

## 2024-11-04 RX ORDER — SERTRALINE HYDROCHLORIDE 25 MG/1
25 TABLET, FILM COATED ORAL DAILY
Qty: 180 TABLET | Refills: 1 | Status: SHIPPED | OUTPATIENT
Start: 2024-11-04 | End: 2025-05-03

## 2024-11-04 NOTE — PROGRESS NOTES
Ambulatory Visit  Name: Ange Vaz      : 1950      MRN: [id number]  Encounter Provider: Silvia Hanson MD  Encounter Date: 2024   Encounter department: [facility] [address]    Assessment & Plan  Hair loss  Has dermatology appointment       Rash  Broad differential but has upcoming dermatology referral       Other fatigue  Multifactorial may be poor quality sleep, may be overmedicated with Zoloft  Orders:    Ambulatory Referral to Sleep Medicine; Future    Polyarthralgia  Broad differential  Orders:    Sedimentation rate, automated; Future    C-reactive protein; Future    RF Screen w/ Reflex to Titer; Future    XR hand 3+ vw left; Future    XR hand 3+ vw right; Future    Iron Panel (Includes Ferritin, Iron Sat%, Iron, and TIBC); Future    Comprehensive metabolic panel; Future    Anxiety    Orders:    sertraline (ZOLOFT) 25 mg tablet; Take 1 tablet (25 mg total) by mouth daily    Dizziness  Cut down on Zoloft see if it provides improvement          History of Present Illness     Rash  This is a new problem. The current episode started in the past 7 days. The problem has been waxing and waning since onset. The affected locations include the left arm and left wrist. The rash is characterized by redness and swelling. It is unknown if there was an exposure to a precipitant. Associated symptoms include fatigue and joint pain. Pertinent negatives include no anorexia, congestion, cough, diarrhea, facial edema, fever, rhinorrhea, shortness of breath, sore throat or vomiting.       Feels liker her joints have sand paper in them. The neck and shoulder are worst. Had a massage    Fatigue. Can fall asleep when sitting down, states she sleeps 8-9 hours but wakes up exhausted     Has a rash on the left arm that is improving. Will itch and burn    Brain fog, sometimes dizzy.    Had covid a few months, 105 fever, felt sick for a few days but then resolved.    History  "obtained from : patient  Review of Systems   Constitutional:  Positive for fatigue. Negative for fever.   HENT:  Negative for congestion, rhinorrhea and sore throat.    Eyes:  Negative for pain.   Respiratory:  Negative for cough and shortness of breath.    Gastrointestinal:  Negative for anorexia, diarrhea and vomiting.   Musculoskeletal:  Positive for joint pain.   Skin:  Positive for rash.     Current Outpatient Medications on File Prior to Visit   Medication Sig Dispense Refill    ketoconazole (NIZORAL) 2 % shampoo Apply 1 Application topically 2 (two) times a week 120 mL 1    loratadine (CLARITIN) 10 mg tablet TAKE 1 TABLET BY MOUTH EVERY DAY 90 tablet 1     No current facility-administered medications on file prior to visit.      Social History     Tobacco Use    Smoking status: Never     Passive exposure: Never    Smokeless tobacco: Never   Vaping Use    Vaping status: Never Used   Substance and Sexual Activity    Alcohol use: Not Currently    Drug use: Never    Sexual activity: Not Currently     Partners: Male     Birth control/protection: None         Objective     /82   Pulse 75   Temp 97.5 °F (36.4 °C)   Ht 5' 2\" (1.575 m)   Wt 52.6 kg (116 lb)   SpO2 95%   BMI 21.22 kg/m²     Physical Exam  Constitutional:       Appearance: Normal appearance.   Pulmonary:      Effort: Pulmonary effort is normal. No respiratory distress.   Skin:     Comments: Erythematous blanching superficial skin rash on left hand for   Neurological:      Mental Status: She is alert.   Psychiatric:         Mood and Affect: Mood normal.         Behavior: Behavior normal.         Thought Content: Thought content normal.         Judgment: Judgment normal.         Answers submitted by the patient for this visit:  Rash Questionnaire (Submitted on 11/1/2024)  Chief Complaint: Rash  nail changes: No    "

## 2024-11-05 ENCOUNTER — TELEPHONE (OUTPATIENT)
Age: 74
End: 2024-11-05

## 2024-11-05 NOTE — TELEPHONE ENCOUNTER
Patient called to make sure she does need to fast for her blood work she plans to have tomorrow morning.  She has multiple orders and CMP does require fasting.  No further questions/concerns.

## 2024-11-06 ENCOUNTER — APPOINTMENT (OUTPATIENT)
Age: 74
End: 2024-11-06
Payer: MEDICARE

## 2024-11-06 DIAGNOSIS — M25.50 POLYARTHRALGIA: ICD-10-CM

## 2024-11-06 LAB
ALBUMIN SERPL BCG-MCNC: 4.4 G/DL (ref 3.5–5)
ALP SERPL-CCNC: 82 U/L (ref 34–104)
ALT SERPL W P-5'-P-CCNC: 13 U/L (ref 7–52)
ANION GAP SERPL CALCULATED.3IONS-SCNC: 7 MMOL/L (ref 4–13)
AST SERPL W P-5'-P-CCNC: 24 U/L (ref 13–39)
BILIRUB SERPL-MCNC: 0.55 MG/DL (ref 0.2–1)
BUN SERPL-MCNC: 13 MG/DL (ref 5–25)
CALCIUM SERPL-MCNC: 9.7 MG/DL (ref 8.4–10.2)
CHLORIDE SERPL-SCNC: 104 MMOL/L (ref 96–108)
CO2 SERPL-SCNC: 28 MMOL/L (ref 21–32)
CREAT SERPL-MCNC: 0.75 MG/DL (ref 0.6–1.3)
CRP SERPL QL: 1.5 MG/L
ERYTHROCYTE [SEDIMENTATION RATE] IN BLOOD: 21 MM/HOUR (ref 0–29)
FERRITIN SERPL-MCNC: 68 NG/ML (ref 11–307)
GFR SERPL CREATININE-BSD FRML MDRD: 78 ML/MIN/1.73SQ M
GLUCOSE P FAST SERPL-MCNC: 95 MG/DL (ref 65–99)
IRON SATN MFR SERPL: 36 % (ref 15–50)
IRON SERPL-MCNC: 118 UG/DL (ref 50–212)
POTASSIUM SERPL-SCNC: 4.5 MMOL/L (ref 3.5–5.3)
PROT SERPL-MCNC: 7.1 G/DL (ref 6.4–8.4)
SODIUM SERPL-SCNC: 139 MMOL/L (ref 135–147)
TIBC SERPL-MCNC: 328 UG/DL (ref 250–450)
UIBC SERPL-MCNC: 210 UG/DL (ref 155–355)

## 2024-11-06 PROCEDURE — 83540 ASSAY OF IRON: CPT

## 2024-11-06 PROCEDURE — 80053 COMPREHEN METABOLIC PANEL: CPT

## 2024-11-06 PROCEDURE — 83550 IRON BINDING TEST: CPT

## 2024-11-06 PROCEDURE — 86140 C-REACTIVE PROTEIN: CPT

## 2024-11-06 PROCEDURE — 82728 ASSAY OF FERRITIN: CPT

## 2024-11-06 PROCEDURE — 86430 RHEUMATOID FACTOR TEST QUAL: CPT

## 2024-11-06 PROCEDURE — 85652 RBC SED RATE AUTOMATED: CPT

## 2024-11-06 PROCEDURE — 36415 COLL VENOUS BLD VENIPUNCTURE: CPT

## 2024-11-06 PROCEDURE — 73130 X-RAY EXAM OF HAND: CPT

## 2024-11-07 LAB — RHEUMATOID FACT SER QL LA: NEGATIVE

## 2024-11-13 ENCOUNTER — OFFICE VISIT (OUTPATIENT)
Age: 74
End: 2024-11-13
Payer: MEDICARE

## 2024-11-13 VITALS
HEART RATE: 69 BPM | TEMPERATURE: 98 F | SYSTOLIC BLOOD PRESSURE: 130 MMHG | DIASTOLIC BLOOD PRESSURE: 90 MMHG | OXYGEN SATURATION: 98 % | BODY MASS INDEX: 21.16 KG/M2 | WEIGHT: 115 LBS | HEIGHT: 62 IN

## 2024-11-13 DIAGNOSIS — D22.9 MULTIPLE NEVI: ICD-10-CM

## 2024-11-13 DIAGNOSIS — D18.01 CHERRY ANGIOMA: ICD-10-CM

## 2024-11-13 DIAGNOSIS — Z13.89 SCREENING FOR SKIN CONDITION: Primary | ICD-10-CM

## 2024-11-13 DIAGNOSIS — D48.9 NEOPLASM OF UNCERTAIN BEHAVIOR: ICD-10-CM

## 2024-11-13 DIAGNOSIS — L20.9 ATOPIC DERMATITIS, UNSPECIFIED TYPE: ICD-10-CM

## 2024-11-13 DIAGNOSIS — L82.1 SEBORRHEIC KERATOSIS: ICD-10-CM

## 2024-11-13 PROCEDURE — 88341 IMHCHEM/IMCYTCHM EA ADD ANTB: CPT | Performed by: STUDENT IN AN ORGANIZED HEALTH CARE EDUCATION/TRAINING PROGRAM

## 2024-11-13 PROCEDURE — 11102 TANGNTL BX SKIN SINGLE LES: CPT | Performed by: DERMATOLOGY

## 2024-11-13 PROCEDURE — 88305 TISSUE EXAM BY PATHOLOGIST: CPT | Performed by: STUDENT IN AN ORGANIZED HEALTH CARE EDUCATION/TRAINING PROGRAM

## 2024-11-13 PROCEDURE — 11103 TANGNTL BX SKIN EA SEP/ADDL: CPT | Performed by: DERMATOLOGY

## 2024-11-13 PROCEDURE — 99204 OFFICE O/P NEW MOD 45 MIN: CPT | Performed by: DERMATOLOGY

## 2024-11-13 PROCEDURE — 88342 IMHCHEM/IMCYTCHM 1ST ANTB: CPT | Performed by: STUDENT IN AN ORGANIZED HEALTH CARE EDUCATION/TRAINING PROGRAM

## 2024-11-13 RX ORDER — FLUTICASONE PROPIONATE 0.05 %
CREAM (GRAM) TOPICAL 2 TIMES DAILY
Qty: 30 G | Refills: 1 | Status: SHIPPED | OUTPATIENT
Start: 2024-11-13

## 2024-11-13 NOTE — PATIENT INSTRUCTIONS
"INFORMED CONSENT DISCUSSION AND POST-OPERATIVE INSTRUCTIONS FOR PATIENT    I.  RATIONALE FOR PROCEDURE  I understand that a skin biopsy allows the Dermatologist to test a lesion or rash under the microscope to obtain a diagnosis.  It usually involves numbing the area with numbing medication and removing a small piece of skin; sometimes the area will be closed with sutures. In this specific procedure, sutures are not usually needed.  If any sutures are placed, then they are usually need to be removed in 2 weeks or less.    I understand that my Dermatologist recommends that a skin \"shave\" biopsy be performed today.  A local anesthetic, similar to the kind that a dentist uses when filling a cavity, will be injected with a very small needle into the skin area to be sampled.  The injected skin and tissue underneath \"will go to sleep” and become numb so no pain should be felt afterwards.  An instrument shaped like a tiny \"razor blade\" (shave biopsy instrument) will be used to cut a small piece of tissue and skin from the area so that a sample of tissue can be taken and examined more closely under the microscope.  A slight amount of bleeding will occur, but it will be stopped with direct pressure and a pressure bandage and any other appropriate methods.  I understands that a scar will form where the wound was created.  Surgical ointment will be applied to help protect the wound.  Sutures are not usually needed.    II.  RISKS AND POTENTIAL COMPLICATIONS   I understand the risks and potential complications of a skin biopsy include but are not limited to the following:  Bleeding  Infection  Pain  Scar/keloid  Skin discoloration  Incomplete Removal  Recurrence  Nerve Damage/Numbness/Loss of Function  Allergic Reaction to Anesthesia  Biopsies are diagnostic procedures and based on findings additional treatment or evaluation may be required  Loss or destruction of specimen resulting in no additional findings    My Dermatologist " "has explained to me the nature of the condition, the nature of the procedure, and the benefits to be reasonably expected compared with alternative approaches.  My Dermatologist has discussed the likelihood of major risks or complications of this procedure including the specific risks listed above, such as bleeding, infection, and scarring/keloid.  I understand that a scar is expected after this procedure.  I understand that my physician cannot predict if the scar will form a \"keloid,\" which extends beyond the borders of the wound that is created.  A keloid is a thick, painful, and bumpy scar.  A keloid can be difficult to treat, as it does not always respond well to therapy, which includes injecting cortisone directly into the keloid every few weeks.  While this usually reduces the pain and size of the scar, it does not eliminate it.      I understand that photographs may be taken before and after the procedure.  These will be maintained as part of the medical providers confidential records and may not be made available to me.  I further authorize the medical provider to use the photographs for teaching purposes or to illustrate scientific papers, books, or lectures if in his/her judgment, medical research, education, or science may benefit from its use.    I have had an opportunity to fully inquire about the risks and benefits of this procedure and its alternatives.   I have been given ample time and opportunity to ask questions and to seek a second opinion if I wished to do so.  I acknowledge that there have specifically been no guarantees as to the cosmetic results from the procedure.  I am aware that with any procedure there is always the possibility of an unexpected complication.    III. POST-PROCEDURAL CARE (WHAT YOU WILL NEED TO DO \"AFTER THE BIOPSY\" TO OPTIMIZE HEALING)    Keep the area clean and dry.  Try NOT to remove the bandage or get it wet for the first 24 hours.    Gently clean the area and apply " "surgical ointment (such as Vaseline petrolatum ointment, which is available \"over the counter\" and not a prescription) to the biopsy site for up to 2 weeks straight.  This acts to protect the wound from the outside world.      Sutures are not usually placed in this procedure.  If any sutures were placed, return for suture removal as instructed (generally 1 week for the face, 2 weeks for the body).      Take Acetaminophen (Tylenol) for discomfort, if no contraindications.  Ibuprofen or aspirin could make bleeding worse.    Call our office immediately for signs of infection: fever, chills, increased redness, warmth, tenderness, discomfort/pain, or pus or foul smell coming from the wound.    WHAT TO DO IF THERE IS ANY BLEEDING?  If a small amount of bleeding is noticed, place a clean cloth over the area and apply firm pressure for ten minutes.  Check the wound after 10 minutes of direct pressure.  If bleeding persists, try one more time for an additional 10 minutes of direct pressure on the area.  If the bleeding becomes heavier or does not stop after the second attempt, or if you have any other questions about this procedure, then please call your [facility] Dermatologist by calling [phone] (SKIN).     I hereby acknowledge that I have reviewed and verified the site with my Dermatologist and have requested and authorized my Dermatologist to proceed with the procedure.    ATOPIC DERMATITIS (\"Hand Dermatitis\")    Physical Exam:  Anatomic Location Affected:  Hands and Sides of fingers  Morphological Description:  erythema scaling and vesicles  Body Surface Area Today:  less than 1%  Overall Severity: moderate  Pertinent Positives:  Pertinent Negatives:    Additional History of Present Condition:  Present for a while    Assessment and Plan:  Based on a thorough discussion of this condition and the management approach to it (including a comprehensive discussion of the known risks, side effects and potential benefits of " "treatment), the patient (family) agrees to implement the following specific plan:  Fluticasone Cream apply twice a day until resolved then stop and repeat for flare ups     Assessment and Plan:   Atopic Dermatitis is a chronic, itchy skin condition that is very common in children but may occur at any age. It is also known as “eczema” or “atopic eczema.” It is the most common form of dermatitis.    Atopic dermatitis usually occurs in people who have an “atopic tendency.”  This means they may develop any or all of these closely linked conditions:  Atopic dermatitis, asthma, hay fever (allergic rhinitis), eosinophilic esophagitis, and gastroenteritis.  Often these conditions run within families with a parent, child or sibling also affected. A family history of asthma, eczema or hay fever is particularly useful in diagnosing atopic dermatitis in infants.    Atopic dermatitis arises because of a complex interaction of genetic and environmental factors. These include defects in skin barrier function making the skin more susceptible to irritation by soap and other contact irritants, the weather, temperature and non-specific triggers.  There is also an element of immune system dysregulation that is often present.  By definition, it is chronic and has a \"waxing-waning\" nature; flares should be expected but with good education and treatment strategies can be minimized.    Some specific tips we discussed:  Dry skin care.  Using only mild cleansers (hypoallergenic and without fragrances) and fragrance free detergent (not “unscented” products which contain a masking agent); we discussed avoiding irritants/fragranced products.  The importance of regular application of moisturizers daily (at least 3 times a day)  The known and theoretical side effects of steroids at length, including but not limited to atrophy of skin and increased pressure in eye (glaucoma) and clouding of the eye's lens (cataracts) if used in or around the eye " for extended durations.  The specific over-the-counter interventions and medications.  Side effects, risks and benefits of topical and oral medications discussed.  After lengthy discussion of etiology and treatment options, we decided to implement the following personalized treatment plan:      EDUCATION AS INTERVENTION!    WHAT IS ATOPIC DERMATITIS?  Atopic dermatitis (also called “eczema”) is a condition of the skin where the skin is dry, red, and itchy.  The main function of the skin is to provide a barrier from the environment and is also the first defense of the immune system.    In atopic dermatitis the skin barrier is decreased or disrupted, and the skin is easily irritated.  As a result, moisture escapes the skin more easily, and environmental allergens and microbes can enter the skin more easily.  Consequently, the skin's immune system is altered.  If there are increased allergic type cells in the skin, the skin may become red and “hyper-excitable.” This leads to itching and a subsequent rash.    WHY DO PEOPLE GET ATOPIC DERMATITIS?  There is no single answer because many factors are involved.  It is likely a combination of genetic makeup and environmental triggers and/or exposures. Excessive drying or sweating of the skin, Irritating soaps, dust mites, and pet dander are some of the more common triggers.  There is no blood test that can be done to confirm this diagnosis. The history and appearance of the skin is usually sufficient for a diagnosis. However, in some cases if the rash does not fit with the history or respond appropriately to treatment, a skin biopsy may be helpful.  Many children do outgrow atopic dermatitis or get better; however, many continue to have sensitive skin into adulthood.  Asthma and hay fever are often seen in many patients with atopic dermatitis; however, asthma flares do not necessarily occur at the same time as skin flares.     PREVENTING FLARES OF ATOPIC DERMATITIS  The first  step is to maintain the skin's barrier function.  Keep the skin well moisturized.  Avoid irritants and triggers.  Use prescribed medicine when there are red or rough areas to help the skin to return to normal as quickly as possible.  Try to limit scratching.     If you keep the skin well moisturized, and avoid coming in contact with things you know irritate your child's skin, there will be less flares.  However, some flares of atopic dermatitis are beyond your control.  You should work with your health care provider to come up with a plan that minimizes flares while minimizing long term use of medications that suppress the immune system.        WHAT ARE SOME OF THE TRIGGERS?  Triggers are different for different people. The most common triggers are:  Heat and sweat for some individuals, cold weather for others.  House dust mites, pet fur.  Wool; synthetic fabrics like nylon; dyed fabrics.  Tobacco smoke   Fragrances in: shampoos, soaps, lotions, laundry detergents, fabric softeners.  Saliva or prolonged exposure to water.    WHAT ABOUT FOOD ALLERGIES?  This is a very controversial topic, as many believe that food allergies are responsible for skin flares. In some cases, specific foods may cause worsening of atopic dermatitis; however this occurs in a minority of cases and usually happens within a few hours of ingestion. While food allergy is more common in children with eczema, foods are specific triggers for flares in only a small percentage of children.  If you notice that the skin flares after certain foods you can see if eliminating one food at time makes a difference, as long as your child can still enjoy a well-balanced diet.   There are blood (RAST) and skin (PRICK) tests that can check for allergies, but they are often positive in children who are not truly allergic. Therefore it is important that you work with your allergist and dermatologist to determine which foods are relevant and causing true symptoms.   Extreme food elimination diets without the guidance of your doctor, which have become more popular in recent years, may even result in worsening of the skin rash due to malnutrition and avoidance of essential nutrients.    TREATMENT  Treatments are aimed at minimizing exposure to irritating factors and decreasing  the skin inflammation which results in an itchy rash.There are many different treatment options, which depend on your child's rash, its location, and severity.  Topical treatments include corticosteroids and steroid-like creams such as Protopic, Elidel, and Eucrisa, which are believed to not thin the skin.  Please read the discussions below regarding risks and benefits of all of these creams.    Occasionally bacterial or viral infections can occur which flare the skin and require oral and/or topical antibiotics or antivirals. In some cases bleach baths 2-3 times weekly can be helpful to prevent recurrent infection.    For severe disease, strong oral medications such as corticosteroids, methotrexate or azathioprine (Imuran) may be needed. These medications require close monitoring and follow-up. You should discuss the risks/ benefits/alternatives of these medications with your health care provider to come up with the best treatment plan for your child.    1) Use moisturizer all over the entire body at least THREE TIMES a day.  This keeps the skin moisturized to restore the barrier function.  Find a cream or ointment that your child likes - this is the most important.  The medicines do not work in the bottle.  The thicker the moisturizer, generally the better barrier it provides.  Ointments often moisturize better than creams; and creams work better than lotions.  Lotions are more useful during the summer when thick greasy ointments are uncomfortable.  If you put moisturizer on the skin after bathing, while the skin is damp, it is twice as effective.  The moisturizer provides a seal holding the water in the  skin.  You may bathe your child in warm - not hot - water, for short periods of time (no more than 5-10 minutes at a time) once a day if they like.  Lightly pat your child dry with a towel and, while the skin is still damp, (within 3 minutes) apply a moisturizer from head to toe.  If your child is using a medicated cream, apply it and allow it to absorb completely BEFORE you apply the moisturizer.    2) Apply the prescription medication TWICE A DAY to only the red, rough areas on the skin OR AS DIRECTED BY YOUR HEALTH CARE PROVIDER  Put the medication on your fingers and gently rub it into the areas.  Usually the medicine will help an area within a few days time.  Try to put the medicine on for two days after you have noticed that the redness is no longer present; this will help the redness from returning.  The severity of the rash and the strength and usage of the medication will determine how quickly you see improvement.    It is important that you do not overuse steroid creams, and if you notice a thin, shiny appearance to the skin or broken blood vessels, you should stop using the cream and consult your health care provider regarding possible overuse/overthinning of the skin.  The face, armpits and groin have particularly thin and sensitive skin and are therefore most at risk for bad results if steroids are over-used in these sites.      3) Avoid triggers.    Some children have specific things that trigger itching and rashes, while others may have none that can be identified.  It may require a little bit of trial and error to see what applies to your child.  Also, triggers can change over time for your child. The most common triggers are listed above; start with these.  Avoid the use of fabric softeners in the washing machine or dryer sheets (unless they are fragrance-free).  Try to use laundry detergents, soaps and shampoos that are fragrance-free. You may find it helpful to double-rinse your clothes.  Some  "children are sensitive to house dust mites and they may benefit from a plastic mattress wrap.  While food allergy is more common in children with eczema, foods are specific triggers for flares in only a small percentage of children.  If you notice that the skin flares after certain foods you can see if eliminating one food at time makes a difference, as long as your child can still enjoy a well-balanced diet.     4) Consider using a medication like an anti-histamine by mouth to help control the itching.    Scratching only makes the skin more reactive and the barrier function even more disrupted.  It can cause both children and their parents to lose sleep!  There are different types of anti-itch medications.  Some cause more drowsiness than others.  Both types are acceptable depending on your child and your preference.  Start with Benadryl and if that does not work, ask for a prescription “antihistamine.”    5) About the prescription creams:  Corticosteroid creams and ointments (generally things with \"-one\" or \"dez\" on the end of their names):  The strength of the cream or ointment depends on the name of the active ingredient.  The numbers at the end do not indicate the relative strength.  Thus triamcinolone 0.1% ointment, considered a mid-strength corticosteroid, is much stronger than hydrocortisone 1% even though the number following the name is much lower.  Topical corticosteroids are very effective in treating atopic dermatitis.  When used in the manner prescribed (to rashy areas of skin and for no more than a few weeks at a time to any one area) they are very safe.  These are corticosteroids and are anti-inflammatory, not the “anabolic steroids” like those used illegally by some athletes.     Topical non-steroid creams and ointments (immunomodulators):  These creams and ointments are also called topical calcineurin inhibitors (TCIs).  These include Protopic ointment and Elidel cream. Crisaborole 2% (Eucrisa) is " a prescription ointment that targets an enzyme called PDE4 (phosphodiesterase 4).  It is used on the skin topically to treat mild-to-moderate eczema in adults and children 2 years of age and older.  In total, these nonsteroidal prescriptions are used to help decrease itching and redness in the skin.  They are not as strong as most steroid creams; however, it is believed that they do not thin the skin when overused.  They are generally used as second-line medications, though they may be used alone or in conjunction with topical steroids.  In sensitive areas such as the face, underarms or groin, they are often recommended.  They can sting inflamed skin, but are generally well tolerated once the skin is healing.    The FDA placed a “black-box” warning on both Elidel and Protopic in 2006 based on animal studies using the medications.  Some animals developed skin cancer and lymphoma.  Subsequently, the FDA released a statement that there is no causal relationship between the two medications and cancer.  Because of this concern, there are ongoing studies to evaluate this relationship in humans.  So far, there are studies that support the safety of these medications.  One showed that the rates of cancer in patient using these medications topically were less than the rates of the general population and another showed that in patient's using the medication over a large area of the body, the levels of the medication in the blood was undetectable.    As for Eucrisa, this product is only approved for the topical treatment of mild-to-moderate eczema in patients 2 years of age and older; use of the medication in kids younger than 2 is considered “off label” and has not been formally studied.  Burning and stinging are the most commonly reported side effects of this medication.  Rarely, this product has been known to cause hives and hypersensitivity reactions; discontinue its use if you develop severe itching, swelling, or redness  in the area of application.

## 2024-11-13 NOTE — PROGRESS NOTES
"[facility] Dermatology Clinic Note     Patient Name: Ange Vaz  Encounter Date: November 13, 2024     Have you been cared for by a [facility] Dermatologist in the last 3 years and, if so, which description applies to you?    NO.   I am considered a \"new\" patient and must complete all patient intake questions. I am FEMALE/of child-bearing potential.    REVIEW OF SYSTEMS:  Have you recently had or currently have any of the following? Recent fever or chills? No  Any non-healing wound? No  Are you pregnant or planning to become pregnant? No  Are you currently or planning to be nursing or breast feeding? No   PAST MEDICAL HISTORY:  Have you personally ever had or currently have any of the following?  If \"YES,\" then please provide more detail. Skin cancer (such as Melanoma, Basal Cell Carcinoma, Squamous Cell Carcinoma?  No  Tuberculosis, HIV/AIDS, Hepatitis B or C: No  Radiation Treatment No   HISTORY OF IMMUNOSUPPRESSION:   Do you have a history of any of the following:  Systemic Immunosuppression such as Diabetes, Biologic or Immunotherapy, Chemotherapy, Organ Transplantation, Bone Marrow Transplantation or Prednsione?  No    Answering \"YES\" requires the addition of the dotphrase \"IMMUNOSUPPRESSED\" as the first diagnosis of the patient's visit.   FAMILY HISTORY:  Any \"first degree relatives\" (parent, brother, sister, or child) with the following?    Skin Cancer, Pancreatic or Other Cancer? YES, Mother - Breast Cancer   PATIENT EXPERIENCE:    Do you want the Dermatologist to perform a COMPLETE skin exam today including a clinical examination under the \"bra and underwear\" areas?  NO  If necessary, do we have your permission to call and leave a detailed message on your Preferred Phone number that includes your specific medical information?  Yes      Allergies   Allergen Reactions    Aspirin Wheezing    Iodine - Food Allergy Anaphylaxis    Bee Venom Swelling    Penicillins Hives      Current Outpatient Medications: " "    ketoconazole (NIZORAL) 2 % shampoo, Apply 1 Application topically 2 (two) times a week, Disp: 120 mL, Rfl: 1    loratadine (CLARITIN) 10 mg tablet, TAKE 1 TABLET BY MOUTH EVERY DAY, Disp: 90 tablet, Rfl: 1    sertraline (ZOLOFT) 25 mg tablet, Take 1 tablet (25 mg total) by mouth daily, Disp: 180 tablet, Rfl: 1          Whom besides the patient is providing clinical information about today's encounter?   NO ADDITIONAL HISTORIAN (patient alone provided history)    Physical Exam and Assessment/Plan by Diagnosis:    Chief complaint: Patient is a 75 y/o female present for spots of concern on the chest that goes and comes, was told it is a fungal infection, another spot on the right temple that is very itchy, also has a rash on the hands that is very itchy. PROVIDER did many lab tests to r/o RA everything has been WNL.    NEOPLASM OF UNCERTAIN BEHAVIOR OF SKIN    Physical Exam:  (Anatomic Location); (Size and Morphological Description); (Differential Diagnosis):  Specimen A; Right Temple; Poorly defined 12mm pink macule; DDX.: R/O BCC  Specimen B; Mid Chest; 12mm pink macule; DDX.: R/O BCC          Pertinent Positives:  Pertinent Negatives:    Additional History of Present Condition:  present on exam    Assessment and Plan:  I have discussed with the patient that a sample of skin via a \"skin biopsy” would be potentially helpful to further make a specific diagnosis under the microscope.  Based on a thorough discussion of this condition and the management approach to it (including a comprehensive discussion of the known risks, side effects and potential benefits of treatment), the patient (family) agrees to implement the following specific plan:    Procedure:  Skin Biopsy.  After a thorough discussion of treatment options and risk/benefits/alternatives (including but not limited to local pain, scarring, dyspigmentation, blistering, possible superinfection, and inability to confirm a diagnosis via histopathology), verbal " "and written consent were obtained and portion of the rash was biopsied for tissue sample.  See below for consent that was obtained from patient and subsequent Procedure Note.      PROCEDURE TANGENTIAL (SHAVE) BIOPSY NOTE:    Performing Physician:   Anatomic Location; Clinical Description with size (cm); Pre-Op Diagnosis:   Specimen A; Right Temple; Poorly defined 12mm pink macule; DDX.: R/O BCC  Specimen B; Mid Chest; 12mm pink macule; DDX.: R/O BCC    Post-op diagnosis: Same     Local anesthesia: 1% xylocaine with epi      Topical anesthesia: None    Hemostasis: Aluminum chloride       After obtaining informed consent  at which time there was a discussion about the purpose of biopsy  and low risks of infection and bleeding.  The area was prepped and draped in the usual fashion. Anesthesia was obtained with 1% lidocaine with epinephrine. A shave biopsy to an appropriate sampling depth was obtained by Shave (Dermablade or 15 blade) The resulting wound was covered with surgical ointment and bandaged appropriately.     The patient tolerated the procedure well without complications and was without signs of functional compromise.      Specimen has been sent for review by Dermatopathology.    Standard post-procedure care has been explained and has been included in written form within the patient's copy of Informed Consent.    ATOPIC DERMATITIS (\"Hand Dermatitis\")    Physical Exam:  Anatomic Location Affected:  Hands and Sides of fingers  Morphological Description:  erythema scaling and vesicles  Body Surface Area Today:  less than 1%  Overall Severity: moderate  Pertinent Positives:  Pertinent Negatives:    Additional History of Present Condition:  Present for a while    Assessment and Plan:  Based on a thorough discussion of this condition and the management approach to it (including a comprehensive discussion of the known risks, side effects and potential benefits of treatment), the patient (family) agrees to " "implement the following specific plan:  Fluticasone Cream apply twice a day until resolved then stop and repeat for flare ups     Assessment and Plan:   Atopic Dermatitis is a chronic, itchy skin condition that is very common in children but may occur at any age. It is also known as “eczema” or “atopic eczema.” It is the most common form of dermatitis.    Atopic dermatitis usually occurs in people who have an “atopic tendency.”  This means they may develop any or all of these closely linked conditions:  Atopic dermatitis, asthma, hay fever (allergic rhinitis), eosinophilic esophagitis, and gastroenteritis.  Often these conditions run within families with a parent, child or sibling also affected. A family history of asthma, eczema or hay fever is particularly useful in diagnosing atopic dermatitis in infants.    Atopic dermatitis arises because of a complex interaction of genetic and environmental factors. These include defects in skin barrier function making the skin more susceptible to irritation by soap and other contact irritants, the weather, temperature and non-specific triggers.  There is also an element of immune system dysregulation that is often present.  By definition, it is chronic and has a \"waxing-waning\" nature; flares should be expected but with good education and treatment strategies can be minimized.    Some specific tips we discussed:  Dry skin care.  Using only mild cleansers (hypoallergenic and without fragrances) and fragrance free detergent (not “unscented” products which contain a masking agent); we discussed avoiding irritants/fragranced products.  The importance of regular application of moisturizers daily (at least 3 times a day)  The known and theoretical side effects of steroids at length, including but not limited to atrophy of skin and increased pressure in eye (glaucoma) and clouding of the eye's lens (cataracts) if used in or around the eye for extended durations.  The specific " over-the-counter interventions and medications.  Side effects, risks and benefits of topical and oral medications discussed.  After lengthy discussion of etiology and treatment options, we decided to implement the following personalized treatment plan:    Scribe Attestation      I,:  Beatriz Palma MA am acting as a scribe while in the presence of the attending physician.:       I,:  Adam Louise MD personally performed the services described in this documentation    as scribed in my presence.:

## 2024-11-15 ENCOUNTER — TRANSCRIBE ORDERS (OUTPATIENT)
Dept: SLEEP CENTER | Facility: CLINIC | Age: 74
End: 2024-11-15

## 2024-11-15 DIAGNOSIS — R53.83 OTHER FATIGUE: ICD-10-CM

## 2024-11-15 DIAGNOSIS — G47.8 OTHER SLEEP DISORDERS: Primary | ICD-10-CM

## 2024-11-19 ENCOUNTER — RESULTS FOLLOW-UP (OUTPATIENT)
Age: 74
End: 2024-11-19

## 2024-11-19 PROCEDURE — 88305 TISSUE EXAM BY PATHOLOGIST: CPT | Performed by: STUDENT IN AN ORGANIZED HEALTH CARE EDUCATION/TRAINING PROGRAM

## 2024-11-19 PROCEDURE — 88342 IMHCHEM/IMCYTCHM 1ST ANTB: CPT | Performed by: STUDENT IN AN ORGANIZED HEALTH CARE EDUCATION/TRAINING PROGRAM

## 2024-11-19 PROCEDURE — 88341 IMHCHEM/IMCYTCHM EA ADD ANTB: CPT | Performed by: STUDENT IN AN ORGANIZED HEALTH CARE EDUCATION/TRAINING PROGRAM

## 2024-11-20 DIAGNOSIS — D04.5 SQUAMOUS CELL CARCINOMA IN SITU (SCCIS) OF SKIN OF CHEST: Primary | ICD-10-CM

## 2024-11-20 RX ORDER — FLUOROURACIL 50 MG/G
CREAM TOPICAL 2 TIMES DAILY
Qty: 40 G | Refills: 0 | Status: SHIPPED | OUTPATIENT
Start: 2024-11-20

## 2024-11-20 NOTE — TELEPHONE ENCOUNTER
Received call from patient stating she read Dr Street message on [employer].    Patient stated that she has a dentist appt this morning but will be free around 12.    Please call patient

## 2024-12-04 ENCOUNTER — RA CDI HCC (OUTPATIENT)
Dept: OTHER | Facility: HOSPITAL | Age: 74
End: 2024-12-04

## 2024-12-11 ENCOUNTER — OFFICE VISIT (OUTPATIENT)
Age: 74
End: 2024-12-11
Payer: MEDICARE

## 2024-12-11 ENCOUNTER — OFFICE VISIT (OUTPATIENT)
Dept: FAMILY MEDICINE CLINIC | Facility: CLINIC | Age: 74
End: 2024-12-11
Payer: MEDICARE

## 2024-12-11 VITALS — HEIGHT: 62 IN | WEIGHT: 115 LBS | TEMPERATURE: 97.8 F | BODY MASS INDEX: 21.16 KG/M2

## 2024-12-11 VITALS
OXYGEN SATURATION: 95 % | HEIGHT: 62 IN | HEART RATE: 72 BPM | DIASTOLIC BLOOD PRESSURE: 78 MMHG | BODY MASS INDEX: 21.2 KG/M2 | TEMPERATURE: 97.5 F | WEIGHT: 115.2 LBS | SYSTOLIC BLOOD PRESSURE: 136 MMHG

## 2024-12-11 DIAGNOSIS — D22.9 MULTIPLE NEVI: Primary | ICD-10-CM

## 2024-12-11 DIAGNOSIS — F41.9 ANXIETY: ICD-10-CM

## 2024-12-11 DIAGNOSIS — L82.1 SEBORRHEIC KERATOSIS: ICD-10-CM

## 2024-12-11 DIAGNOSIS — D18.01 CHERRY ANGIOMA: ICD-10-CM

## 2024-12-11 DIAGNOSIS — C44.92 SQUAMOUS CELL SKIN CANCER: ICD-10-CM

## 2024-12-11 DIAGNOSIS — Z00.00 ENCOUNTER FOR ANNUAL WELLNESS VISIT (AWV) IN MEDICARE PATIENT: ICD-10-CM

## 2024-12-11 DIAGNOSIS — L65.0 TELOGEN EFFLUVIUM: ICD-10-CM

## 2024-12-11 DIAGNOSIS — Z12.11 SCREEN FOR COLON CANCER: Primary | ICD-10-CM

## 2024-12-11 PROCEDURE — 99214 OFFICE O/P EST MOD 30 MIN: CPT | Performed by: DERMATOLOGY

## 2024-12-11 PROCEDURE — G0438 PPPS, INITIAL VISIT: HCPCS | Performed by: STUDENT IN AN ORGANIZED HEALTH CARE EDUCATION/TRAINING PROGRAM

## 2024-12-11 PROCEDURE — 99214 OFFICE O/P EST MOD 30 MIN: CPT | Performed by: STUDENT IN AN ORGANIZED HEALTH CARE EDUCATION/TRAINING PROGRAM

## 2024-12-11 NOTE — PROGRESS NOTES
Name: Ange Vaz      : 1950      MRN: [id number]  Encounter Provider: Silvia Hanson MD  Encounter Date: 2024   Encounter department: [address]    Assessment & Plan  Screen for colon cancer    Orders:  •  Cologuard    Encounter for annual wellness visit (AWV) in Medicare patient         Squamous cell skin cancer  Reviewed derm note, topical cream treatment and close follow up. Skin protection       Anxiety  Stable on zoloft          Preventive health issues were discussed with patient, and age appropriate screening tests were ordered as noted in patient's After Visit Summary. Personalized health advice and appropriate referrals for health education or preventive services given if needed, as noted in patient's After Visit Summary.    History of Present Illness     HPI     Awv    Diagnosed with squamous cell cancer of the ksin, started on cream      Patient Care Team:  Silvia Hanson MD as PCP - General (Family Medicine)    Review of Systems   Constitutional:  Negative for chills, fatigue and fever.   HENT:  Negative for rhinorrhea and sore throat.    Eyes:  Negative for visual disturbance.   Respiratory:  Negative for cough and shortness of breath.    Cardiovascular:  Negative for chest pain and palpitations.   Gastrointestinal:  Negative for abdominal pain, constipation, diarrhea, nausea and vomiting.   Genitourinary:  Negative for difficulty urinating, dysuria and frequency.   Musculoskeletal:  Negative for arthralgias and myalgias.   Skin:  Negative for color change and rash.   Neurological:  Negative for weakness and headaches.     Medical History Reviewed by provider this encounter:       Annual Wellness Visit Questionnaire   Ange is here for her Subsequent Wellness visit.     Health Risk Assessment:   Patient rates overall health as excellent. Patient feels that their physical health rating is same. Patient is satisfied with their  life. Eyesight was rated as much better. Hearing was rated as same. Patient feels that their emotional and mental health rating is slightly better. Patients states they are never, rarely angry. Patient states they are often unusually tired/fatigued. Pain experienced in the last 7 days has been some. Patient's pain rating has been 2/10. Patient states that she has experienced no weight loss or gain in last 6 months.     Depression Screening:   PHQ-2 Score: 0      Fall Risk Screening:   In the past year, patient has experienced: no history of falling in past year      Urinary Incontinence Screening:   Patient has not leaked urine accidently in the last six months.     Home Safety:  Patient does not have trouble with stairs inside or outside of their home. Patient has working smoke alarms and has working carbon monoxide detector. Home safety hazards include: none.     Nutrition:   Current diet is Regular.     Medications:   Patient is currently taking over-the-counter supplements. OTC medications include: see medication list. Patient is able to manage medications.     Activities of Daily Living (ADLs)/Instrumental Activities of Daily Living (IADLs):   Walk and transfer into and out of bed and chair?: Yes  Dress and groom yourself?: Yes    Bathe or shower yourself?: Yes    Feed yourself? Yes  Do your laundry/housekeeping?: Yes  Manage your money, pay your bills and track your expenses?: Yes  Make your own meals?: Yes    Do your own shopping?: Yes    Previous Hospitalizations:   Any hospitalizations or ED visits within the last 12 months?: No      Advance Care Planning:   Living will: No    Durable POA for healthcare: No    Advanced directive: No      PREVENTIVE SCREENINGS        Diabetes Screening:     General: Screening Current      Breast Cancer Screening:     General: Screening Current      Cervical Cancer Screening:    General: Screening Not Indicated      Lung Cancer Screening:     General: Screening Not  "Indicated    Screening, Brief Intervention, and Referral to Treatment (SBIRT)    Screening  Typical number of drinks in a day: 0  Typical number of drinks in a week: 0  Interpretation: Low risk drinking behavior.    Single Item Drug Screening:  How often have you used an illegal drug (including marijuana) or a prescription medication for non-medical reasons in the past year? never    Single Item Drug Screen Score: 0  Interpretation: Negative screen for possible drug use disorder    Social Drivers of Health     Financial Resource Strain: Low Risk  (11/20/2023)    Overall Financial Resource Strain (CARDIA)    • Difficulty of Paying Living Expenses: Not hard at all   Food Insecurity: No Food Insecurity (12/11/2024)    Hunger Vital Sign    • Worried About Running Out of Food in the Last Year: Never true    • Ran Out of Food in the Last Year: Never true   Transportation Needs: No Transportation Needs (12/11/2024)    PRAPARE - Transportation    • Lack of Transportation (Medical): No    • Lack of Transportation (Non-Medical): No   Housing Stability: Low Risk  (12/11/2024)    Housing Stability Vital Sign    • Unable to Pay for Housing in the Last Year: No    • Number of Times Moved in the Last Year: 1    • Homeless in the Last Year: No   Utilities: Not At Risk (12/11/2024)    [facility] Utilities    • Threatened with loss of utilities: No     No results found.    Objective   /78   Pulse 72   Temp 97.5 °F (36.4 °C) (Tympanic)   Ht 5' 2\" (1.575 m)   Wt 52.3 kg (115 lb 3.2 oz)   SpO2 95%   BMI 21.07 kg/m²     Physical Exam  Constitutional:       General: She is not in acute distress.     Appearance: Normal appearance. She is not ill-appearing.   HENT:      Head: Normocephalic and atraumatic.      Right Ear: Tympanic membrane, ear canal and external ear normal.      Left Ear: Tympanic membrane, ear canal and external ear normal.      Nose: Nose normal.      Mouth/Throat:      Mouth: Mucous membranes are moist.      " Pharynx: Oropharynx is clear. No oropharyngeal exudate or posterior oropharyngeal erythema.   Eyes:      General: No scleral icterus.        Right eye: No discharge.         Left eye: No discharge.      Extraocular Movements: Extraocular movements intact.      Conjunctiva/sclera: Conjunctivae normal.      Pupils: Pupils are equal, round, and reactive to light.   Cardiovascular:      Rate and Rhythm: Normal rate and regular rhythm.      Pulses: Normal pulses.      Heart sounds: Normal heart sounds. No murmur heard.  Pulmonary:      Effort: Pulmonary effort is normal. No respiratory distress.      Breath sounds: Normal breath sounds.   Abdominal:      General: Bowel sounds are normal.      Palpations: Abdomen is soft.      Tenderness: There is no abdominal tenderness.   Musculoskeletal:         General: Normal range of motion.      Cervical back: Normal range of motion and neck supple.   Lymphadenopathy:      Cervical: No cervical adenopathy.   Skin:     General: Skin is warm and dry.      Capillary Refill: Capillary refill takes less than 2 seconds.      Comments: Scabbed oiver lesion on chest and R side of face/temple area   Neurological:      General: No focal deficit present.      Mental Status: She is alert and oriented to person, place, and time. Mental status is at baseline.      Cranial Nerves: No cranial nerve deficit.   Psychiatric:         Mood and Affect: Mood normal.

## 2024-12-11 NOTE — PATIENT INSTRUCTIONS
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   Over the counter mens Rogaine 2x a day .     What is telogen effluvium?  Telogen effluvium is the name for temporary hair loss due to the shedding of resting or telogen hair after some shock to the system. New hair continues to grow. Telogen hair has a bulb or club-shaped tip.    It should be distinguished from anagen effluvium, in which the hair shedding is due to interruption of active or anagen hair growth by drugs, toxins or inflammation (eg, alopecia areata). Anagen hair has a pointed or tapered tip.    What is the cause of telogen effluvium?  In a normal healthy person's scalp about 85% of the hair follicles are actively growing hair (anagen hair) and 15% are resting hair (telogen hair). A hair follicle usually grows anagen hair for 4 years or so, then rests for about 4 months. The resting or telogen hair has a club or bulb at the tip. A new anagen hair begins to grow under the resting telogen hair and pushes it out. Thus, it is normal to lose up to about 100 hairs a day on one's comb, brush, in the basin or on the pillow, as a result of the normal scalp hair cycle.    If there is some shock to the system, as many as 70% of the anagen hairs can be precipitated into telogen, thus reversing the usual ratio.     Typical \"shock\" triggers include:  Illness, especially if there is fever   Surgical operation   Accident   Childbirth   Nervous shock   Weight loss or unusual diet   Certain medications   Discontinuing the contraceptive pill   Overseas travel resulting in jetlag   Excessive sun " exposure    At first, the resting scalp hairs, now in the form of club hairs, remain firmly attached to the hair follicles. It is only about 2-4 months after the shock that the new hairs coming up through the scalp push out the 'dead' club hairs and increased hair fall are noticed.    Thus, paradoxically, with this type of hair loss, hair fall is a sign of hair regrowth. As the new hair first comes up through the scalp and pushes out the dead hair a fine fringe of new hair is often evident along the forehead hairline. At first, the fall of club hairs is profuse and a general thinning of the scalp hair may become evident but after several months a peak is reached and hair fall begins to lessen, gradually tapering back to normal over 6-9 months. As the hair fall tapers off the scalp thickens back up to normal, but recovery may be incomplete in some cases.    Because nail and hair growth are under the same influences, an arrest in hair growth is often mirrored in the nails by a groove across them coinciding with the time of the shock to the system. This is called Beau's line.     The time of the shock can be estimated from the fact that a fingernail takes 5 months to grow from the posterior nail fold to the free edge. So if the groove in the nail is half way down the nail then the shock must have been 2 1/2 months ago.    Chronic telogen effluvium  In some patients, hair shedding continues to be intermittently or continuously greater than normal for long periods of time, sometimes for years. The hair cycle appears to be reset so that the anagen period is shortened.    Chronic telogen effluvium often presents in women that actually continue to have quite thick and moderately long hair - this is because they notice the shed hair more than those with finer or shorter hair. Telogen effluvium does not cause complete baldness, although it may unmask a genetic tendency to genetic balding i.e. female pattern hair loss, or in  "men, male pattern hair loss.    What is the treatment for telogen effluvium?  Telogen effluvium is self-correcting. It is really not influenced by any treatment that can be given. However, gentle handling of the hair, avoiding over-vigorous combing, brushing and any type of scalp massage are important.    You should also ensure a nutritious diet, with plenty of protein, fruit and vegetables.    The doctor may check your thyroid function, and levels of iron, vitamin B12 and folic acid, as any deficiency in these can slow hair growth. The psychological effects of hair loss should not be ignored.         MELANOCYTIC NEVI (\"Moles\")    Paste patient specific assessment and plan here     Melanocytic nevi (\"moles\") are tan or brown, raised or flat areas of the skin which have an increased number of melanocytes. Melanocytes are the cells in our body which make pigment and account for skin color.    Some moles are present at birth (I.e., \"congenital nevi\"), while others come up later in life (i.e., \"acquired nevi\").  The sun can stimulate the body to make more moles.  Sunburns are not the only thing that triggers more moles.  Chronic sun exposure can do it too.     Clinically distinguishing a healthy mole from melanoma may be difficult, even for experienced dermatologists. The \"ABCDE's\" of moles have been suggested as a means of helping to alert a person to a suspicious mole and the possible increased risk of melanoma.  The suggestions for raising alert are as follows:    Asymmetry: Healthy moles tend to be symmetric, while melanomas are often asymmetric.  Asymmetry means if you draw a line through the mole, the two halves do not match in color, size, shape, or surface texture. Asymmetry can be a result of rapid enlargement of a mole, the development of a raised area on a previously flat lesion, scaling, ulceration, bleeding or scabbing within the mole.  Any mole that starts to demonstrate \"asymmetry\" should be examined " "promptly by a board certified dermatologist.     Border: Healthy moles tend to have discrete, even borders.  The border of a melanoma often blends into the normal skin and does not sharply delineate the mole from normal skin.  Any mole that starts to demonstrate \"uneven borders\" should be examined promptly by a board certified dermatologist.     Color: Healthy moles tend to be one color throughout.  Melanomas tend to be made up of different colors ranging from dark black, blue, white, or red.  Any mole that demonstrates a color change should be examined promptly by a board certified dermatologist.     Diameter: Healthy moles tend to be smaller than 0.6 cm in size; an exception are \"congenital nevi\" that can be larger.  Melanomas tend to grow and can often be greater than 0.6 cm (1/4 of an inch, or the size of a pencil eraser). This is only a guideline, and many normal moles may be larger than 0.6 cm without being unhealthy.  Any mole that starts to change in size (small to bigger or bigger to smaller) should be examined promptly by a board certified dermatologist.     Evolving: Healthy moles tend to \"stay the same.\"  Melanomas may often show signs of change or evolution such as a change in size, shape, color, or elevation.  Any mole that starts to itch, bleed, crust, burn, hurt, or ulcerate or demonstrate a change or evolution should be examined promptly by a board certified dermatologist.      Dysplastic Nevi  Dysplastic moles are moles that fit the ABCDE rules of melanoma but are not identified as melanomas when examined under the microscope.  They may indicate an increased risk of melanoma in that person. If there is a family history of melanoma, most experts agree that the person may be at an increased risk for developing a melanoma.  Experts still do not agree on what dysplastic moles mean in patients without a personal or family history of melanoma.  Dysplastic moles are usually larger than common moles and " "have different colors within it with irregular borders. The appearance can be very similar to a melanoma. Biopsies of dysplastic moles may show abnormalities which are different from a regular mole.      Melanoma  Malignant melanoma is a type of skin cancer that can be deadly if it spreads throughout the body. The incidence of melanoma in the [place] is growing faster than any other cancer. Melanoma usually grows near the surface of the skin for a period of time, and then begins to grow deeper into the skin. Once it grows deeper into the skin, the risk of spread to other organs greatly increases. Therefore, early detection and removal of a malignant melanoma may result in a better chance at a complete cure; removal after the tumor has spread may not be as effective, leading to worse clinical outcomes such as death.    The true rate of nevus transformation into a melanoma is unknown. It has been estimated that the lifetime risk for any acquired melanocytic nevus on any 20-year-old individual transforming into melanoma by age 80 is 0.03% (1 in 3,164) for men and 0.009% (1 in 10,800) for women.     The appearance of a \"new mole\" remains one of the most reliable methods for identifying a malignant melanoma.  Occasionally, melanomas appear as rapidly growing, blue-black, dome-shaped bumps within a previous mole or previous area of normal skin.  Other times, melanomas are suspected when a mole suddenly appears or changes. Itching, burning, or pain in a pigmented lesion should increase suspicion, but most patients with early melanoma have no skin discomfort whatsoever.  Melanoma can occur anywhere on the skin, including areas that are difficult for self-examination. Many melanomas are first noticed by other family members.  Suspicious-looking moles may be removed for microscopic examination.       You may be able to prevent death from melanoma by doing two simple things:    Try to avoid unnecessary sun exposure and " "protect your skin when it is exposed to the sun.  People who live near the equator, people who have intermittent exposures to large amounts of sun, and people who have had sunburns in childhood or adolescence have an increased risk for melanoma. Sun sense and vigilant sun protection may be keys to helping to prevent melanoma.  We recommend wearing UPF-rated sun protective clothing and sunglasses whenever possible and applying a moisturizer-sunscreen combination product (SPF 50+) such as Neutrogena Daily Defense to sun exposed areas of skin at least three times a day.    Have your moles regularly examined by a board certified dermatologist AND by yourself or a family member/friend at home.  We recommend that you have your moles examined at least once a year by a board certified dermatologist.  Use your birthday as an annual reminder to have your \"Birthday Suit\" (I.e., your skin) examined; it is a nice birthday gift to yourself to know that your skin is healthy appearing!  Additionally, at-home self examinations may be helpful for detecting a possible melanoma.  Use the ABCDEs we discussed and check your moles once a month at home.        SEBORRHEIC KERATOSIS  A seborrheic keratosis is a harmless warty spot that appears during adult life as a common sign of skin aging.  Seborrheic keratoses can arise on any area of skin, covered or uncovered, with the usual exception of the palms and soles. They do not arise from mucous membranes. Seborrheic keratoses can have highly variable appearance.      Seborrheic keratoses are extremely common. It has been estimated that over 90% of adults over the age of 60 years have one or more of them. They occur in males and females of all races, typically beginning to erupt in the 30s or 40s. They are uncommon under the age of 20 years.  The precise cause of seborrhoeic keratoses is not known.  Seborrhoeic keratoses are considered degenerative in nature. As time goes by, seborrheic " "keratoses tend to become more numerous. Some people inherit a tendency to develop a very large number of them; some people may have hundreds of them.    The name \"seborrheic keratosis\" is misleading, because these lesions are not limited to a seborrhoeic distribution (scalp, mid-face, chest, upper back), nor are they formed from sebaceous glands, nor are they associated with sebum -- which is greasy.  Seborrheic keratosis may also be called \"SK,\" \"Seb K,\" \"basal cell papilloma,\" \"senile wart,\" or \"barnacle.\"      There is no easy way to remove multiple lesions on a single occasion.  Unless a specific lesion is \"inflamed\" and is causing pain or stinging/burning or is bleeding, most insurance companies do not authorize treatment.      ANGIOMA (\"CHERRY ANGIOMA\")  Whitt angiomas markedly increase in number from about the age of 40, so it has been estimated that 75% of people over 75 years of age have them. Although they also called \"senile angiomas,\" they can occur in young people too - 5% of adolescents have been found to have them.     Cherry angiomas are very common in males and females of any age or race, with no difference in sexes or races affected. They are however more noticeable in white skin than in skin of colour.  There may be a family history of similar lesions. Eruptive (very large number appearing in a short period of time) cherry angiomas have been rarely reported to be associated with internal malignancy and pregnancy.   "

## 2024-12-11 NOTE — PROGRESS NOTES
"[facility] Note     Patient Name: Ange Vaz  Encounter Date: December 11, 2024     Have you been cared for by a [facility] Dermatologist in the last 3 years and, if so, which description applies to you?    Yes.  I have been here within the last 3 years, and my medical history has NOT changed since that time.  I am FEMALE/of child-bearing potential.    REVIEW OF SYSTEMS:  Have you recently had or currently have any of the following? No changes in my recent health.   PAST MEDICAL HISTORY:  Have you personally ever had or currently have any of the following?  If \"YES,\" then please provide more detail. No changes in my medical history.   HISTORY OF IMMUNOSUPPRESSION: Do you have a history of any of the following:  Systemic Immunosuppression such as Diabetes, Biologic or Immunotherapy, Chemotherapy, Organ Transplantation, Bone Marrow Transplantation or Prednisone?  No     Answering \"YES\" requires the addition of the dotphrase \"IMMUNOSUPPRESSED\" as the first diagnosis of the patient's visit.   FAMILY HISTORY:  Any \"first degree relatives\" (parent, brother, sister, or child) with the following?    No changes in my family's known health.   PATIENT EXPERIENCE:    Do you want the Dermatologist to perform a COMPLETE skin exam today including a clinical examination under the \"bra and underwear\" areas?  Yes  If necessary, do we have your permission to call and leave a detailed message on your Preferred Phone number that includes your specific medical information?  Yes      Allergies   Allergen Reactions    Aspirin Wheezing    Iodine - Food Allergy Anaphylaxis    Bee Venom Swelling    Penicillins Hives      Current Outpatient Medications:     fluorouracil (EFUDEX) 5 % cream, Apply topically 2 (two) times a day Apply to lesion on  chest and R temple x 4 weeks, Disp: 40 g, Rfl: 0    fluticasone (CUTIVATE) 0.05 % cream, Apply topically 2 (two) times a day To rash on hands till clear, Disp: 30 g, Rfl: 1    " sertraline (ZOLOFT) 25 mg tablet, Take 1 tablet (25 mg total) by mouth daily, Disp: 180 tablet, Rfl: 1    ketoconazole (NIZORAL) 2 % shampoo, Apply 1 Application topically 2 (two) times a week (Patient not taking: Reported on 11/13/2024), Disp: 120 mL, Rfl: 1          Whom besides the patient is providing clinical information about today's encounter?   NO ADDITIONAL HISTORIAN (patient alone provided history)    Physical Exam and Assessment/Plan by Diagnosis:    Chief Complaint: Patient is a 73 y/o female present today for concern of hair loss.   SQUAMOUS CELL CARCINOMA    Physical Exam:  Anatomic Location Affected:  mid chest, R temple  Morphological Description:  crusted areas  Pertinent Positives:  Pertinent Negatives:    Additional History of Present Condition:  patient has applied Efudex for a couple of weeks. Previously biopsied Squamous cell cancer in situ  Assessment and Plan:  Based on a thorough discussion of this condition and the management approach to it (including a comprehensive discussion of the known risks, side effects and potential benefits of treatment), the patient (family) agrees to implement the following specific plan:  Patient to complete treatment and reassess at next visit. Patient also has lesions on the forehead and Left ear which  she should treat with efudex for 2 weeks.     What is cutaneous squamous cell carcinoma?  Cutaneous squamous cell carcinoma (SCC) is a common type of keratinocyte or non-melanoma skin cancer. It is derived from cells within the epidermis that make keratin -- the horny protein that makes up skin, hair and nails.  Cutaneous SCC is an invasive disease, referring to cancer cells that have grown beyond the epidermis. SCC can sometimes metastasise and may prove fatal.  Intraepidermal carcinoma (cutaneous SCC in situ) and mucosal SCC are considered elsewhere.    Who gets cutaneous squamous cell carcinoma?  Risk factors for cutaneous SCC include:  Age and sex: SCCs are  particularly prevalent in elderly males. However, they also affect females and younger adults.   Previous SCC or another form of skin cancer (basal cell carcinoma, melanoma) are a strong predictor for further skin cancers.   Actinic keratoses   Outdoor occupation or recreation   Smoking   Fair skin, blue eyes and blond or red hair   Previous cutaneous injury, thermal burn, disease (eg cutaneous lupus, epidermolysis bullosa, leg ulcer)   Inherited syndromes: SCC is a particular problem for families with xeroderma pigmentosum and albinism   Other risk factors include ionising radiation, exposure to arsenic, and immune suppression due to disease (eg chronic lymphocytic leukaemia) or medicines. Organ transplant recipients have a massively increased risk of developing SCC.    What causes cutaneous squamous cell carcinoma?  More than 90% of cases of SCC are associated with numerous DNA mutations in multiple somatic genes. Mutations in the p53 tumour suppressor gene are caused by exposure to ultraviolet radiation (UV), especially UVB (known as signature 7). Other signature mutations relate to cigarette smoking, ageing and immune suppression (eg, to drugs such as azathioprine). Mutations in signalling pathways affect the epidermal growth factor receptor, DOTTIE, Fyn, and [id number] signalling.   Beta-genus human papillomaviruses (wart virus) are thought to play a role in SCC arising in immune-suppressed populations. ?-HPV and HPV subtypes 5, 8, 17, 20, 24, and 38 have also been associated with an increased risk of cutaneous SCC in immunocompetent individuals.     What are the clinical features of cutaneous squamous cell carcinoma?  Cutaneous SCCs present as enlarging scaly or crusted lumps. They usually arise within pre-existing actinic keratosis or intraepidermal carcinoma.  They grow over weeks to months   They may ulcerate   They are often tender or painful   Located on sun-exposed sites, particularly the face, lips, ears,  hands, forearms and lower legs   Size varies from a few millimetres to several centimetres in diameter.    Types of cutaneous squamous cell carcinoma  Distinct clinical types of invasive cutaneous SCC include:  Cutaneous horn -- the horn is due to excessive production of keratin   Keratoacanthoma (KA) -- a rapidly growing keratinising nodule that may resolve without treatment   Carcinoma cuniculatum (‘verrucous carcinoma’), a slow-growing, warty tumour on the sole of the foot.   Multiple eruptive SCC/KA-like lesions arising in syndromes, such as multiple self-healing squamous epitheliomas of Kelly-Smith and Grzybowski syndrome  The pathologist may classify a tumour as well differentiated, moderately well differentiated, poorly differentiated or anaplastic cutaneous SCC. There are other variants.    Classification of squamous cell carcinoma by risk  Cutaneous SCC is classified as low-risk or high-risk, depending on the chance of tumour recurrence and metastasis. Characteristics of high-risk SCC include:  High-risk cutaneous squamous cell carcinoma has the following characteristics:  Diameter greater than or equal to 2 cm   Location on the ear, vermilion of the lip, central face, hands, feet, genitalia   Arising in elderly or immune suppressed patient   Histological thickness greater than 2 mm, poorly differentiated histology, or with the invasion of the subcutaneous tissue, nerves and blood vessels  Metastatic SCC is found in regional lymph nodes (80%), lungs, liver, brain, bones and skin.    Staging cutaneous squamous cell carcinoma  In 2011, the American Joint Committee on Cancer (AJCC) published a new staging systemic for cutaneous SCC for the 7th Edition of the AJCC manual. This evaluates the dimensions of the original primary tumour (T) and its metastases to lymph nodes (N).    Tumour staging for cutaneous SCC  TX: Th Primary tumour cannot be assessed  T0: No evidence of a primary tumour  Tis: Carcinoma in  situ  T1: Tumour <= 2cm without high-risk features  T2: Tumour >= 2cm; or; Tumour <= 2 cm with high-risk features  T3: Tumour with the invasion of maxilla, mandible, orbit or temporal bone  T4: Tumour with the invasion of axial or appendicular skeleton or perineural invasion of skull base    Christal staging for cutaneous SCC  NX: Regional lymph nodes cannot be assessed  N0: No regional lymph node metastasis  N1: Metastasis in one local lymph node <= 3cm  N2: Metastasis in one local lymph node >= 3cm; or; Metastasis in >1 local lymph node <= 6cm  N3: Metastasis in lymph node >= 6cm    How is squamous cell carcinoma diagnosed?  Diagnosis of cutaneous SCC is based on clinical features. The diagnosis and histological subtype are confirmed pathologically by diagnostic biopsy or following excision. See squamous cell carcinoma - pathology.  Patients with high-risk SCC may also undergo staging investigations to determine whether it has spread to lymph nodes or elsewhere. These may include:  Imaging using ultrasound scan, X-rays, CT scans, MRI scans   Lymph node or other tissue biopsies    What is the treatment for cutaneous squamous cell carcinoma?  Cutaneous SCC is nearly always treated surgically. Most cases are excised with a 3-10 mm margin of normal tissue around a visible tumour. A flap or skin graft may be needed to repair the defect.  Other methods of removal include:  Shave, curettage, and electrocautery for low-risk tumours on trunk and limbs   Aggressive cryotherapy for very small, thin, low-risk tumours   Mohs micrographic surgery for large facial lesions with indistinct margins or recurrent tumours   Radiotherapy for an inoperable tumour, patients unsuitable for surgery, or as adjuvant    What is the treatment for advanced or metastatic squamous cell carcinoma?  Locally advanced primary, recurrent or metastatic SCC requires multidisciplinary consultation. Often a combination of treatments is used.  Surgery  "  Radiotherapy   Cemiplimab   Experimental targeted therapy using epidermal growth factor receptor inhibitors    How can cutaneous squamous cell carcinoma be prevented?  There is a great deal of evidence to show that very careful sun protection at any time of life reduces the number of SCCs. This is particularly important in ageing, sun-damaged, fair skin; in patients that are immune suppressed; and in those who already have actinic keratoses or previous SCC.  Stay indoors or under the shade in the middle of the day   Wear covering clothing   Apply high protection factor SPF50+ broad-spectrum sunscreens generously to exposed skin if outdoors   Avoid indoor tanning (sun beds, solaria)    Oral nicotinamide (vitamin B3) in a dose of 500 mg twice daily may reduce the number and severity of SCCs in people at high risk.  Patients with multiple squamous cell carcinomas may be prescribed an oral retinoid (acitretin or isotretinoin). These reduce the number of tumours but have some nuisance side effects.    What is the outlook for cutaneous squamous cell carcinoma?  Most SCCs are cured by treatment. A cure is most likely if treatment is undertaken when the lesion is small. The risk of recurrence or disease-associated death is greater for tumours that are > 20 mm in diameter and/or > 2 mm in thickness at the time of surgical excision.  About 50% of people at high risk of SCC develop a second one within 5 years of the first. They are also at increased risk of other skin cancers, especially melanoma. Regular self-skin examinations and long-term annual skin checks by an experienced health professional are recommended.   MELANOCYTIC NEVI (\"Moles\")    Physical Exam:  Anatomic Location Affected: Mostly on sun-exposed areas of the trunk and extremities  Morphological Description:  Scattered, 1-4mm round to ovoid, symmetrical-appearing, even bordered, skin colored to dark brown macules/papules, mostly in sun-exposed areas  Pertinent " "Positives:  Pertinent Negatives:    Additional History of Present Condition:  present on exam    Assessment and Plan:  Based on a thorough discussion of this condition and the management approach to it (including a comprehensive discussion of the known risks, side effects and potential benefits of treatment), the patient (family) agrees to implement the following specific plan:  Provided handout with information regarding the ABCDE's of moles   Recommend routine skin exams every 6 months   Sun avoidance, protective clothing (known as UPF clothing), and the use of at least SPF 30 sunscreens is advised. Sunscreen should be reapplied every two hours when outside.     SEBORRHEIC KERATOSIS; NON-INFLAMED    Physical Exam:  Anatomic Location Affected:  scattered across trunk, extremities,  face  Morphological Description:  Flat and raised, waxy, smooth to warty textured, yellow to brownish-grey to dark brown to blackish, discrete, \"stuck-on\" appearing papules.  Pertinent Positives:  Pertinent Negatives:    Additional History of Present Condition:  Patient reports new bumps on the skin.  Denies itch, burn, pain, bleeding or ulceration.  Present constantly; nothing seems to make it worse or better.  No prior treatment.      Assessment and Plan:  Based on a thorough discussion of this condition and the management approach to it (including a comprehensive discussion of the known risks, side effects and potential benefits of treatment), the patient (family) agrees to implement the following specific plan:  Reassured benign      ANGIOMA (\"CHERRY ANGIOMA\")    Physical Exam:  Anatomic Location: scattered across sun exposed areas of the trunk and extremities   Morphologic Description: Firm red to reddish-blue discrete papules  Pertinent Positives:  Pertinent Negatives:    Additional History of Present Condition:  Present on exam.     Assessment and Plan:  Reassured benign      TELOGEN EFFLUVIUM    Physical Exam:  Anatomic Location " "Affected:  scalp  Morphological Description:  thinning hair   Pertinent Positives:  Pertinent Negatives:    Additional History of Present Condition:  patient noted shedding about 2 years ago after stress in her life. Appears to be growing back and stabilizing.     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  Over the counter mens Rogaine 2x a day .     What is telogen effluvium?  Telogen effluvium is the name for temporary hair loss due to the shedding of resting or telogen hair after some shock to the system. New hair continues to grow. Telogen hair has a bulb or club-shaped tip.    It should be distinguished from anagen effluvium, in which the hair shedding is due to interruption of active or anagen hair growth by drugs, toxins or inflammation (eg, alopecia areata). Anagen hair has a pointed or tapered tip.    What is the cause of telogen effluvium?  In a normal healthy person's scalp about 85% of the hair follicles are actively growing hair (anagen hair) and 15% are resting hair (telogen hair). A hair follicle usually grows anagen hair for 4 years or so, then rests for about 4 months. The resting or telogen hair has a club or bulb at the tip. A new anagen hair begins to grow under the resting telogen hair and pushes it out. Thus, it is normal to lose up to about 100 hairs a day on one's comb, brush, in the basin or on the pillow, as a result of the normal scalp hair cycle.    If there is some shock to the system, as many as 70% of the anagen hairs can be precipitated into telogen, thus reversing the usual ratio.     Typical \"shock\" triggers include:  Illness, especially if there is fever   Surgical operation "   Accident   Childbirth   Nervous shock   Weight loss or unusual diet   Certain medications   Discontinuing the contraceptive pill   Overseas travel resulting in jetlag   Excessive sun exposure    At first, the resting scalp hairs, now in the form of club hairs, remain firmly attached to the hair follicles. It is only about 2-4 months after the shock that the new hairs coming up through the scalp push out the 'dead' club hairs and increased hair fall are noticed.    Thus, paradoxically, with this type of hair loss, hair fall is a sign of hair regrowth. As the new hair first comes up through the scalp and pushes out the dead hair a fine fringe of new hair is often evident along the forehead hairline. At first, the fall of club hairs is profuse and a general thinning of the scalp hair may become evident but after several months a peak is reached and hair fall begins to lessen, gradually tapering back to normal over 6-9 months. As the hair fall tapers off the scalp thickens back up to normal, but recovery may be incomplete in some cases.    Because nail and hair growth are under the same influences, an arrest in hair growth is often mirrored in the nails by a groove across them coinciding with the time of the shock to the system. This is called Beau's line.     The time of the shock can be estimated from the fact that a fingernail takes 5 months to grow from the posterior nail fold to the free edge. So if the groove in the nail is half way down the nail then the shock must have been 2 1/2 months ago.    Chronic telogen effluvium  In some patients, hair shedding continues to be intermittently or continuously greater than normal for long periods of time, sometimes for years. The hair cycle appears to be reset so that the anagen period is shortened.    Chronic telogen effluvium often presents in women that actually continue to have quite thick and moderately long hair - this is because they notice the shed hair more than  those with finer or shorter hair. Telogen effluvium does not cause complete baldness, although it may unmask a genetic tendency to genetic balding i.e. female pattern hair loss, or in men, male pattern hair loss.    What is the treatment for telogen effluvium?  Telogen effluvium is self-correcting. It is really not influenced by any treatment that can be given. However, gentle handling of the hair, avoiding over-vigorous combing, brushing and any type of scalp massage are important.    You should also ensure a nutritious diet, with plenty of protein, fruit and vegetables.    The doctor may check your thyroid function, and levels of iron, vitamin B12 and folic acid, as any deficiency in these can slow hair growth. The psychological effects of hair loss should not be ignored.         Scribe Attestation      I,:  Aysha Hernández MA am acting as a scribe while in the presence of the attending physician.:       I,:  Adam Louise MD personally performed the services described in this documentation    as scribed in my presence.:

## 2025-05-07 ENCOUNTER — TELEPHONE (OUTPATIENT)
Dept: FAMILY MEDICINE CLINIC | Facility: CLINIC | Age: 75
End: 2025-05-07

## 2025-05-07 DIAGNOSIS — Z20.7 EXPOSURE TO PARASITIC DISEASE: ICD-10-CM

## 2025-05-07 DIAGNOSIS — Z01.89 PATIENT REQUEST FOR DIAGNOSTIC TESTING: Primary | ICD-10-CM

## 2025-06-07 ENCOUNTER — RA CDI HCC (OUTPATIENT)
Dept: OTHER | Facility: HOSPITAL | Age: 75
End: 2025-06-07

## 2025-06-13 ENCOUNTER — OFFICE VISIT (OUTPATIENT)
Dept: FAMILY MEDICINE CLINIC | Facility: CLINIC | Age: 75
End: 2025-06-13
Payer: MEDICARE

## 2025-06-13 VITALS
BODY MASS INDEX: 21.71 KG/M2 | RESPIRATION RATE: 18 BRPM | SYSTOLIC BLOOD PRESSURE: 112 MMHG | HEART RATE: 72 BPM | DIASTOLIC BLOOD PRESSURE: 72 MMHG | HEIGHT: 62 IN | WEIGHT: 118 LBS | OXYGEN SATURATION: 95 %

## 2025-06-13 DIAGNOSIS — F41.9 ANXIETY: Primary | ICD-10-CM

## 2025-06-13 PROCEDURE — 99213 OFFICE O/P EST LOW 20 MIN: CPT | Performed by: STUDENT IN AN ORGANIZED HEALTH CARE EDUCATION/TRAINING PROGRAM

## 2025-06-13 PROCEDURE — G2211 COMPLEX E/M VISIT ADD ON: HCPCS | Performed by: STUDENT IN AN ORGANIZED HEALTH CARE EDUCATION/TRAINING PROGRAM

## 2025-06-13 NOTE — PROGRESS NOTES
"Name: Ange Vaz      : 1950      MRN: [id number]  Encounter Provider: Silvia Hanson MD  Encounter Date: 2025   Encounter department: [facility] [address]  :  Assessment & Plan  Anxiety  Stable today continue with zoloft              History of Present Illness   HPI    Dog has end stage renal disease and some stress fom that    Following at a med spa, getting something like botox injected on the face which is helping. Getting PRP in her hairline to help fill out hair  Review of Systems   Constitutional:  Negative for chills, fatigue and fever.   HENT:  Negative for rhinorrhea and sore throat.    Eyes:  Negative for visual disturbance.   Respiratory:  Negative for cough and shortness of breath.    Cardiovascular:  Negative for chest pain and palpitations.   Gastrointestinal:  Negative for abdominal pain, constipation, diarrhea, nausea and vomiting.   Genitourinary:  Negative for difficulty urinating, dysuria and frequency.   Musculoskeletal:  Negative for arthralgias and myalgias.   Skin:  Negative for color change and rash.   Neurological:  Negative for weakness and headaches.       Objective   /72 (BP Location: Left arm, Patient Position: Sitting, Cuff Size: Standard)   Pulse 72   Resp 18   Ht 5' 2\" (1.575 m)   Wt 53.5 kg (118 lb)   SpO2 95%   BMI 21.58 kg/m²      Physical Exam  Constitutional:       General: She is not in acute distress.     Appearance: Normal appearance. She is not ill-appearing.   HENT:      Head: Normocephalic and atraumatic.      Right Ear: Tympanic membrane, ear canal and external ear normal.      Left Ear: Tympanic membrane, ear canal and external ear normal.      Nose: Nose normal.      Mouth/Throat:      Mouth: Mucous membranes are moist.      Pharynx: Oropharynx is clear. No oropharyngeal exudate or posterior oropharyngeal erythema.     Eyes:      General: No scleral icterus.        Right eye: No discharge.         " Left eye: No discharge.      Extraocular Movements: Extraocular movements intact.      Conjunctiva/sclera: Conjunctivae normal.      Pupils: Pupils are equal, round, and reactive to light.       Cardiovascular:      Rate and Rhythm: Normal rate and regular rhythm.      Pulses: Normal pulses.      Heart sounds: Normal heart sounds. No murmur heard.  Pulmonary:      Effort: Pulmonary effort is normal. No respiratory distress.      Breath sounds: Normal breath sounds.   Abdominal:      General: Bowel sounds are normal.      Palpations: Abdomen is soft.      Tenderness: There is no abdominal tenderness.     Musculoskeletal:         General: Normal range of motion.      Cervical back: Normal range of motion and neck supple.   Lymphadenopathy:      Cervical: No cervical adenopathy.     Skin:     General: Skin is warm and dry.      Capillary Refill: Capillary refill takes less than 2 seconds.     Neurological:      General: No focal deficit present.      Mental Status: She is alert and oriented to person, place, and time. Mental status is at baseline.      Cranial Nerves: No cranial nerve deficit.     Psychiatric:         Mood and Affect: Mood normal.

## 2025-06-23 ENCOUNTER — TELEPHONE (OUTPATIENT)
Age: 75
End: 2025-06-23

## 2025-06-23 NOTE — TELEPHONE ENCOUNTER
Hello    This message is in regard to your appointment on 08/05/2025 in Dermatology. The provider will not be available that day. Please call the main Dermatology line at [phone] to reschedule your visit at your earliest convenience.    Thanks for your cooperation.  Nadeen FERNANDEZ,  Dermatology Clinical Assistant   
You can access the FollowMyHealth Patient Portal offered by [facility] by registering at the following website: [email or web address]. By joining [facility]’s FollowMyHealth portal, you will also be able to view your health information using other applications (apps) compatible with our system.

## 2025-07-21 ENCOUNTER — OFFICE VISIT (OUTPATIENT)
Age: 75
End: 2025-07-21
Payer: MEDICARE

## 2025-07-21 VITALS
HEART RATE: 76 BPM | OXYGEN SATURATION: 97 % | HEIGHT: 62 IN | RESPIRATION RATE: 16 BRPM | BODY MASS INDEX: 21.79 KG/M2 | TEMPERATURE: 97.6 F | DIASTOLIC BLOOD PRESSURE: 68 MMHG | WEIGHT: 118.4 LBS | SYSTOLIC BLOOD PRESSURE: 118 MMHG

## 2025-07-21 DIAGNOSIS — Z85.828 HISTORY OF SKIN CANCER: ICD-10-CM

## 2025-07-21 DIAGNOSIS — L57.0 KERATOSIS, ACTINIC: ICD-10-CM

## 2025-07-21 DIAGNOSIS — L20.9 ATOPIC DERMATITIS, UNSPECIFIED TYPE: Primary | ICD-10-CM

## 2025-07-21 DIAGNOSIS — D22.9 MULTIPLE NEVI: ICD-10-CM

## 2025-07-21 DIAGNOSIS — D18.01 CHERRY ANGIOMA: ICD-10-CM

## 2025-07-21 DIAGNOSIS — L82.1 SEBORRHEIC KERATOSIS: ICD-10-CM

## 2025-07-21 PROCEDURE — 99214 OFFICE O/P EST MOD 30 MIN: CPT

## 2025-07-21 RX ORDER — TRIAMCINOLONE ACETONIDE 5 MG/G
CREAM TOPICAL 2 TIMES DAILY
Qty: 15 G | Refills: 2 | Status: CANCELLED | OUTPATIENT
Start: 2025-07-21

## 2025-07-21 RX ORDER — FLUOROURACIL 50 MG/G
CREAM TOPICAL
Qty: 40 G | Refills: 0 | Status: SHIPPED | OUTPATIENT
Start: 2025-07-21

## 2025-07-21 RX ORDER — TRIAMCINOLONE ACETONIDE 1 MG/G
CREAM TOPICAL
Qty: 45 G | Refills: 2 | Status: SHIPPED | OUTPATIENT
Start: 2025-07-21

## 2025-07-21 NOTE — PATIENT INSTRUCTIONS
"MELANOCYTIC NEVI (\"Moles\")        Melanocytic nevi (\"moles\") are tan or brown, raised or flat areas of the skin which have an increased number of melanocytes. Melanocytes are the cells in our body which make pigment and account for skin color.    Some moles are present at birth (I.e., \"congenital nevi\"), while others come up later in life (i.e., \"acquired nevi\").  The sun can stimulate the body to make more moles.  Sunburns are not the only thing that triggers more moles.  Chronic sun exposure can do it too.     Clinically distinguishing a healthy mole from melanoma may be difficult, even for experienced dermatologists. The \"ABCDE's\" of moles have been suggested as a means of helping to alert a person to a suspicious mole and the possible increased risk of melanoma.  The suggestions for raising alert are as follows:    Asymmetry: Healthy moles tend to be symmetric, while melanomas are often asymmetric.  Asymmetry means if you draw a line through the mole, the two halves do not match in color, size, shape, or surface texture. Asymmetry can be a result of rapid enlargement of a mole, the development of a raised area on a previously flat lesion, scaling, ulceration, bleeding or scabbing within the mole.  Any mole that starts to demonstrate \"asymmetry\" should be examined promptly by a board certified dermatologist.     Border: Healthy moles tend to have discrete, even borders.  The border of a melanoma often blends into the normal skin and does not sharply delineate the mole from normal skin.  Any mole that starts to demonstrate \"uneven borders\" should be examined promptly by a board certified dermatologist.     Color: Healthy moles tend to be one color throughout.  Melanomas tend to be made up of different colors ranging from dark black, blue, white, or red.  Any mole that demonstrates a color change should be examined promptly by a board certified dermatologist.     Diameter: Healthy moles tend to be smaller than 0.6 " "cm in size; an exception are \"congenital nevi\" that can be larger.  Melanomas tend to grow and can often be greater than 0.6 cm (1/4 of an inch, or the size of a pencil eraser). This is only a guideline, and many normal moles may be larger than 0.6 cm without being unhealthy.  Any mole that starts to change in size (small to bigger or bigger to smaller) should be examined promptly by a board certified dermatologist.     Evolving: Healthy moles tend to \"stay the same.\"  Melanomas may often show signs of change or evolution such as a change in size, shape, color, or elevation.  Any mole that starts to itch, bleed, crust, burn, hurt, or ulcerate or demonstrate a change or evolution should be examined promptly by a board certified dermatologist.      Dysplastic Nevi  Dysplastic moles are moles that fit the ABCDE rules of melanoma but are not identified as melanomas when examined under the microscope.  They may indicate an increased risk of melanoma in that person. If there is a family history of melanoma, most experts agree that the person may be at an increased risk for developing a melanoma.  Experts still do not agree on what dysplastic moles mean in patients without a personal or family history of melanoma.  Dysplastic moles are usually larger than common moles and have different colors within it with irregular borders. The appearance can be very similar to a melanoma. Biopsies of dysplastic moles may show abnormalities which are different from a regular mole.      Melanoma  Malignant melanoma is a type of skin cancer that can be deadly if it spreads throughout the body. The incidence of melanoma in the [place] is growing faster than any other cancer. Melanoma usually grows near the surface of the skin for a period of time, and then begins to grow deeper into the skin. Once it grows deeper into the skin, the risk of spread to other organs greatly increases. Therefore, early detection and removal of a malignant " "melanoma may result in a better chance at a complete cure; removal after the tumor has spread may not be as effective, leading to worse clinical outcomes such as death.    The true rate of nevus transformation into a melanoma is unknown. It has been estimated that the lifetime risk for any acquired melanocytic nevus on any 20-year-old individual transforming into melanoma by age 80 is 0.03% (1 in 3,164) for men and 0.009% (1 in 10,800) for women.     The appearance of a \"new mole\" remains one of the most reliable methods for identifying a malignant melanoma.  Occasionally, melanomas appear as rapidly growing, blue-black, dome-shaped bumps within a previous mole or previous area of normal skin.  Other times, melanomas are suspected when a mole suddenly appears or changes. Itching, burning, or pain in a pigmented lesion should increase suspicion, but most patients with early melanoma have no skin discomfort whatsoever.  Melanoma can occur anywhere on the skin, including areas that are difficult for self-examination. Many melanomas are first noticed by other family members.  Suspicious-looking moles may be removed for microscopic examination.       You may be able to prevent death from melanoma by doing two simple things:    Try to avoid unnecessary sun exposure and protect your skin when it is exposed to the sun.  People who live near the equator, people who have intermittent exposures to large amounts of sun, and people who have had sunburns in childhood or adolescence have an increased risk for melanoma. Sun sense and vigilant sun protection may be keys to helping to prevent melanoma.  We recommend wearing UPF-rated sun protective clothing and sunglasses whenever possible and applying a moisturizer-sunscreen combination product (SPF 50+) such as Neutrogena Daily Defense to sun exposed areas of skin at least three times a day.    Have your moles regularly examined by a board certified dermatologist AND by yourself or " "a family member/friend at home.  We recommend that you have your moles examined at least once a year by a board certified dermatologist.  Use your birthday as an annual reminder to have your \"Birthday Suit\" (I.e., your skin) examined; it is a nice birthday gift to yourself to know that your skin is healthy appearing!  Additionally, at-home self examinations may be helpful for detecting a possible melanoma.  Use the ABCDEs we discussed and check your moles once a month at home.        SEBORRHEIC KERATOSIS  A seborrheic keratosis is a harmless warty spot that appears during adult life as a common sign of skin aging.  Seborrheic keratoses can arise on any area of skin, covered or uncovered, with the usual exception of the palms and soles. They do not arise from mucous membranes. Seborrheic keratoses can have highly variable appearance.      Seborrheic keratoses are extremely common. It has been estimated that over 90% of adults over the age of 60 years have one or more of them. They occur in males and females of all races, typically beginning to erupt in the 30s or 40s. They are uncommon under the age of 20 years.  The precise cause of seborrhoeic keratoses is not known.  Seborrhoeic keratoses are considered degenerative in nature. As time goes by, seborrheic keratoses tend to become more numerous. Some people inherit a tendency to develop a very large number of them; some people may have hundreds of them.    The name \"seborrheic keratosis\" is misleading, because these lesions are not limited to a seborrhoeic distribution (scalp, mid-face, chest, upper back), nor are they formed from sebaceous glands, nor are they associated with sebum -- which is greasy.  Seborrheic keratosis may also be called \"SK,\" \"Seb K,\" \"basal cell papilloma,\" \"senile wart,\" or \"barnacle.\"      There is no easy way to remove multiple lesions on a single occasion.  Unless a specific lesion is \"inflamed\" and is causing pain or stinging/burning or " "is bleeding, most insurance companies do not authorize treatment.      ANGIOMA (\"CHERRY ANGIOMA\")  Whitt angiomas markedly increase in number from about the age of 40, so it has been estimated that 75% of people over 75 years of age have them. Although they also called \"senile angiomas,\" they can occur in young people too - 5% of adolescents have been found to have them.     Cherry angiomas are very common in males and females of any age or race, with no difference in sexes or races affected. They are however more noticeable in white skin than in skin of colour.  There may be a family history of similar lesions. Eruptive (very large number appearing in a short period of time) cherry angiomas have been rarely reported to be associated with internal malignancy and pregnancy.  Actinic keratoses are very common on sites repeatedly exposed to the sun, especially the backs of the hands and the face.  They are considered precancers and have a low risk of turning into squamous cell carcinoma. It is rare for a solitary actinic keratosis to evolve into a squamous cell carcinoma (SCC), but the risk is 10-15% when more than 10 actinic keratoses are present. A tender, thickened, ulcerated or enlarging actinic keratosis is suspicious of SCC.    Actinic keratoses may be prevented by strict sun protection. If already present, keratoses may improve with a very high sun protection factor (50+) broad-spectrum sunscreen applied at least daily to affected areas, year-round.  We recommend that sun protective clothing and hats and sunglasses be worn whenever possible.  Note that you can make you own UPF 30 rate clothing using just your own washing machine with a product called sun guard    There are several different options for treating actinic keratoses    Topical “medications such as 5-fluorouracil or Aldara  - good for field treatment ie treats what's seen and not seen    Cryotherapy - good for single spots but treats “only what we " "see” versus a field treatment    Photodynamic therapy - involves application of a light sensitizing medicine and then exposure to a special light, also a good field treatment  INSTRUCTIONS FOR USE    Treating an Area of Skin  This approach is used when you have widespread involvement over an area such as the scalp, face, or forearms.   Usually, in this case, Efudex is applied twice a day.  The duration of treatment depends on the skin condition and the specific anatomic area being treated. Typically treatment extends for 2-3 weeks for the face versus 4-6 weeks for the scalp, arms and legs.     What to Expect During Therapy  Redness  This caused by inflammation and destruction of the abnormal cells and indicates your lesions are responding to the treatment.   You may use an over-the-counter hydrocortisone 1% ointment to decrease redness, although the treatment may be somewhat more effective if you are able to avoid hydrocortisone use.   It usually takes 2-4 weeks for the redness to fade after stopping the Efudex; most of the redness resolves over the first 1-2 weeks.     Crusting and peeling   This occurs as the sun-damaged skin is removed to allow for replacement by normal skin.   Cool washcloth soaks (washcloth soaked with cool water over face for 15-20 minutes 3-4 times per day) can help as well as a moisturizing ointment, such as Vaseline or Aquaphor ointment.   It is preferable to be moisturized rather than have the skin be dry.   It is fine to wash your face with plain water or a mild cleanser such as Dove bar or Cetaphil Gentle Facial Cleanser during your treatment course.     Itching and pain   This can be controlled with acetaminophen (Tylenol) or non-steroidal anti- inflammatory medication (ibuprofen, naproxen)     Some PRO\" Tips…   You can apply the Efudex with a Q-tip (if spot treating), gloves, or fingers. If fingers are used, however, remember to wash your hands thoroughly to avoid irritation on normal " "skin.   Care must be taken with Efudex during nahum months because the medicine is reactive with the sun. DO NOT USE EFUDEX IN SUMMER MONTHS unless specifically directed to by your doctor.   Do NOT use Efudex on eyelids or lips unless specifically directed to do so.   Care must be taken with Efudex in skin fold areas like the fold from the nose to the corner of the mouth. Try to avoid having any Efudex accumulate in those areas.   Not all \"bumps\" will respond to the Efudex. There are non-cancerous (\"benign\") types of other keratoses that will not react to the medication (such as seborrheic keratoses). If the area treated is not getting red it does not mean the medicine is not working.   Consider the Efudex as an investment in the health of your skin:  The benefits of “sticking it out” are worth it!  Efudex is an excellent way to reverse many years of sun damage.     When to Contact Your Dermatologist   Once you have completed your prescribed course of therapy, wait for a period of 4 weeks to see if treated areas resolve. If you have lesions that have not responded, make a follow-up appointment with your doctor in about 8 weeks.   In evaluating unresponsive areas your doctor will need the skin completely “settled down,” and this usually takes a period of at least two months.   If you are having extensive burning, itching, swelling or tenderness call [facility]  Dermatology at [phone] ([phone]) to arrange an Efudex check with our clinical nurse, who has extensive experience with Efudex treatment. Although the Efudex treatment sometimes produces dramatic redness, crusting and peeling, problems such as allergic reactions and infection are rare and should be evaluated promptly.       "

## 2025-07-21 NOTE — PROGRESS NOTES
"[facility]at[facility] Note     Patient Name: Ange Vaz  Encounter Date: July 21,2025       Have you been cared for by a [facility] Dermatologist in the last 3 years and, if so, which description applies to you? Yes. I have been here within the last 3 years, and my medical history has NOT changed since that time. I am not of child-bearing potential.     REVIEW OF SYSTEMS:  Have you recently had or currently have any of the following? No changes in my recent health.   PAST MEDICAL HISTORY:  Have you personally ever had or currently have any of the following?  If \"YES,\" then please provide more detail. No changes in my medical history.   HISTORY OF IMMUNOSUPPRESSION: Do you have a history of any of the following:  Systemic Immunosuppression such as Diabetes, Biologic or Immunotherapy, Chemotherapy, Organ Transplantation, Bone Marrow Transplantation or Prednisone?  No     Answering \"YES\" requires the addition of the dotphrase \"IMMUNOSUPPRESSED\" as the first diagnosis of the patient's visit.   FAMILY HISTORY:  Any \"first degree relatives\" (parent, brother, sister, or child) with the following?    No changes in my family's known health.   PATIENT EXPERIENCE:    Do you want the Dermatologist to perform a COMPLETE skin exam today including a clinical examination under the \"bra and underwear\" areas?  Yes  If necessary, do we have your permission to call and leave a detailed message on your Preferred Phone number that includes your specific medical information?  Yes      Allergies[1] Current Medications[2]              Whom besides the patient is providing clinical information about today's encounter?   NO ADDITIONAL HISTORIAN (patient alone provided history)    Physical Exam and Assessment/Plan by Diagnosis:    Patient did not want a full skin exam.     HISTORY OF SQUAMOUS CELL CARCINOMA     Physical Exam:  Anatomic Location Affected:  mid chest, R temple  Morphological Description:  well healed scars   "   Additional History of Present Condition:  Previously biopsied squamous cell cancer in situ, treated with Efudex 5% cream for a couple of weeks with resolution.   Assessment and Plan:  Based on a thorough discussion of this condition and the management approach to it (including a comprehensive discussion of the known risks, side effects and potential benefits of treatment), the patient (family) agrees to implement the following specific plan:  Monitor for recurrence.   Skin exams every 6 months.   Practice sun protection with SPF 30 or higher or UPF clothing.         ACTINIC KERATOSIS    Physical Exam:  Anatomic Location Affected:  right forehead   Morphological Description:  scaling pink macule     Additional History of Present Condition:  present at exam.      Assessment and Plan:  Based on a thorough discussion of this condition and the management approach to it (including a comprehensive discussion of the known risks, side effects and potential benefits of treatment), the patient (family) agrees to implement the following specific plan:    Discussed treatment with cryotherapy versus  Efudex 5% cream. Patient would like to use Efudex 5% cream as she has used this before-apply twice a day for 2 weeks. Wash hands after application. Practice sun protection. Keep out of reach of children and pets.   Follow-up: 4-6 weeks for recheck.     ATOPIC DERMATITIS    Physical Exam:  Anatomic Location Affected:  bilateral forearms and hands   Morphological Description:  dry flaking skin, scaly pink patches     Overall Severity: mild      Additional History of Present Condition:  Patient notes an itchy rash from time to time. She has not been using anything on it to help with the itch. She denies use of new skin care products.     Assessment and Plan:  Based on a thorough discussion of this condition and the management approach to it (including a comprehensive discussion of the known risks, side effects and potential benefits  "of treatment), the patient (family) agrees to implement the following specific plan:  Apply triamcinolone 0.1% cream twice a day for 14 days then as needed for flares. Avoid face, underarms, and groin.   Shower with lukewarm water for no longer than 15 minutes.   Can use Dove Bar for Sensitive Skin.   Apply moisturizer multiple times per day.   Switch to All Free and Clear HypoAlergenci Laundry Detergent.       MELANOCYTIC NEVI (\"Moles\")    Physical Exam:  Anatomic Location Affected:   Mostly on sun-exposed areas of the trunk and upper extremities   Morphological Description:  Scattered, 1-4mm round to ovoid, symmetrical-appearing, even bordered, skin colored to dark brown macules/papules      Additional History of Present Condition:  Present on exam. Denies any pain, itch, bleeding. Present for years. Denies actively changing or growing moles.     Assessment and Plan:  Based on a thorough discussion of this condition and the management approach to it (including a comprehensive discussion of the known risks, side effects and potential benefits of treatment), the patient (family) agrees to implement the following specific plan:  Reassured benign.   Monitor for changes. ABCDE's of melanoma handout provided.   Practice sun protection. Apply broad spectrum (UVA and UVB) sunscreen, SPF 30 or higher every 2 hours. Wear sun protective clothing, hats, and sunglasses.     LENTIGO    Physical Exam:  Anatomic Location Affected:  sun exposed areas of face, trunk and upper extremities   Morphological Description:  multiple scattered tan to brown evenly pigmented macules      Additional History of Present Condition:  Present on exam.     Assessment and Plan:  Based on a thorough discussion of this condition and the management approach to it (including a comprehensive discussion of the known risks, side effects and potential benefits of treatment), the patient (family) agrees to implement the following specific plan:  Reassured " "benign.   Practice sun protection. Apply broad spectrum (UVA and UVB) sunscreen, SPF 30 or higher every 2 hours. Wear sun protective clothing, hats, and sunglasses.       SEBORRHEIC KERATOSIS; NON-INFLAMED    Physical Exam:  Anatomic Location Affected:  trunk and upper extremities   Morphological Description:  Flat and raised, waxy, smooth to warty textured, yellow to brownish-grey to dark brown to blackish, discrete, \"stuck-on\" appearing papules.      Additional History of Present Condition:  Present on exam. Patient denies any itching.     Assessment and Plan:  Based on a thorough discussion of this condition and the management approach to it (including a comprehensive discussion of the known risks, side effects and potential benefits of treatment), the patient (family) agrees to implement the following specific plan:  Reassured benign.           Follow-up: 4-6 weeks for re-check for atopic dermatitis and actinic keratosis.      Scribe Attestation      I,:  Nadeen Noriega MA am acting as a scribe while in the presence of the attending physician.:       I,:  Milena Mclain PA-C personally performed the services described in this documentation    as scribed in my presence.:                [1]   Allergies  Allergen Reactions    Aspirin Wheezing    Iodine - Food Allergy Anaphylaxis    Bee Venom Swelling    Penicillins Hives   [2]   Current Outpatient Medications:     fluorouracil (EFUDEX) 5 % cream, Apply topically 2 (two) times a day Apply to lesion on  chest and R temple x 4 weeks, Disp: 40 g, Rfl: 0    fluticasone (CUTIVATE) 0.05 % cream, Apply topically 2 (two) times a day To rash on hands till clear, Disp: 30 g, Rfl: 1    sertraline (ZOLOFT) 25 mg tablet, Take 1 tablet (25 mg total) by mouth daily, Disp: 180 tablet, Rfl: 1    "

## 2025-08-14 ENCOUNTER — NURSE TRIAGE (OUTPATIENT)
Age: 75
End: 2025-08-14

## 2025-08-19 ENCOUNTER — OFFICE VISIT (OUTPATIENT)
Dept: FAMILY MEDICINE CLINIC | Facility: CLINIC | Age: 75
End: 2025-08-19
Payer: MEDICARE

## 2025-08-19 VITALS
SYSTOLIC BLOOD PRESSURE: 136 MMHG | HEART RATE: 72 BPM | HEIGHT: 62 IN | DIASTOLIC BLOOD PRESSURE: 84 MMHG | WEIGHT: 119 LBS | BODY MASS INDEX: 21.9 KG/M2 | OXYGEN SATURATION: 97 % | TEMPERATURE: 97 F

## 2025-08-19 DIAGNOSIS — M54.6 ACUTE MIDLINE THORACIC BACK PAIN: Primary | ICD-10-CM

## 2025-08-19 PROCEDURE — 99214 OFFICE O/P EST MOD 30 MIN: CPT | Performed by: STUDENT IN AN ORGANIZED HEALTH CARE EDUCATION/TRAINING PROGRAM

## 2025-08-19 PROCEDURE — G2211 COMPLEX E/M VISIT ADD ON: HCPCS | Performed by: STUDENT IN AN ORGANIZED HEALTH CARE EDUCATION/TRAINING PROGRAM

## 2025-08-19 RX ORDER — METHYLPREDNISOLONE 4 MG/1
TABLET ORAL
Qty: 21 EACH | Refills: 0 | Status: SHIPPED | OUTPATIENT
Start: 2025-08-19

## 2025-08-19 RX ORDER — CYCLOBENZAPRINE HCL 5 MG
5 TABLET ORAL 3 TIMES DAILY PRN
Qty: 30 TABLET | Refills: 0 | Status: SHIPPED | OUTPATIENT
Start: 2025-08-19